# Patient Record
Sex: MALE | Race: WHITE | Employment: OTHER | URBAN - METROPOLITAN AREA
[De-identification: names, ages, dates, MRNs, and addresses within clinical notes are randomized per-mention and may not be internally consistent; named-entity substitution may affect disease eponyms.]

---

## 2017-04-09 ENCOUNTER — APPOINTMENT (OUTPATIENT)
Dept: ULTRASOUND IMAGING | Facility: HOSPITAL | Age: 62
DRG: 291 | End: 2017-04-09
Attending: INTERNAL MEDICINE
Payer: COMMERCIAL

## 2017-04-09 ENCOUNTER — APPOINTMENT (OUTPATIENT)
Dept: GENERAL RADIOLOGY | Facility: HOSPITAL | Age: 62
DRG: 291 | End: 2017-04-09
Attending: EMERGENCY MEDICINE
Payer: COMMERCIAL

## 2017-04-09 ENCOUNTER — HOSPITAL ENCOUNTER (INPATIENT)
Facility: HOSPITAL | Age: 62
LOS: 6 days | Discharge: HOME | DRG: 291 | End: 2017-04-15
Attending: EMERGENCY MEDICINE | Admitting: INTERNAL MEDICINE
Payer: COMMERCIAL

## 2017-04-09 DIAGNOSIS — R09.02 HYPOXIA: Primary | ICD-10-CM

## 2017-04-09 DIAGNOSIS — R77.8 ELEVATED TROPONIN: ICD-10-CM

## 2017-04-09 DIAGNOSIS — N17.9 ACUTE RENAL FAILURE, UNSPECIFIED ACUTE RENAL FAILURE TYPE (HCC): ICD-10-CM

## 2017-04-09 DIAGNOSIS — J81.0 ACUTE PULMONARY EDEMA (HCC): ICD-10-CM

## 2017-04-09 DIAGNOSIS — M31.31 WEGENER'S GRANULOMATOSIS WITH RENAL INVOLVEMENT (HCC): ICD-10-CM

## 2017-04-09 PROBLEM — R79.89 ELEVATED TROPONIN: Status: ACTIVE | Noted: 2017-04-09

## 2017-04-09 PROCEDURE — 99223 1ST HOSP IP/OBS HIGH 75: CPT | Performed by: INTERNAL MEDICINE

## 2017-04-09 PROCEDURE — 71010 XR CHEST AP PORTABLE  (CPT=71010): CPT

## 2017-04-09 PROCEDURE — 76770 US EXAM ABDO BACK WALL COMP: CPT

## 2017-04-09 RX ORDER — HYDRALAZINE HYDROCHLORIDE 25 MG/1
25 TABLET, FILM COATED ORAL EVERY 8 HOURS SCHEDULED
Status: DISCONTINUED | OUTPATIENT
Start: 2017-04-09 | End: 2017-04-10

## 2017-04-09 RX ORDER — METHYLPREDNISOLONE SODIUM SUCCINATE 500 MG/1
500 INJECTION, POWDER, FOR SOLUTION INTRAMUSCULAR; INTRAVENOUS DAILY
Status: DISCONTINUED | OUTPATIENT
Start: 2017-04-09 | End: 2017-04-09 | Stop reason: SDUPTHER

## 2017-04-09 RX ORDER — IPRATROPIUM BROMIDE AND ALBUTEROL SULFATE 2.5; .5 MG/3ML; MG/3ML
3 SOLUTION RESPIRATORY (INHALATION) ONCE
Status: COMPLETED | OUTPATIENT
Start: 2017-04-09 | End: 2017-04-09

## 2017-04-09 RX ORDER — ASPIRIN 81 MG/1
324 TABLET, CHEWABLE ORAL ONCE
Status: COMPLETED | OUTPATIENT
Start: 2017-04-09 | End: 2017-04-09

## 2017-04-09 RX ORDER — ZOLPIDEM TARTRATE 5 MG/1
5 TABLET ORAL NIGHTLY PRN
Status: DISCONTINUED | OUTPATIENT
Start: 2017-04-09 | End: 2017-04-15

## 2017-04-09 RX ORDER — BUMETANIDE 0.25 MG/ML
2 INJECTION, SOLUTION INTRAMUSCULAR; INTRAVENOUS EVERY 8 HOURS SCHEDULED
Status: DISCONTINUED | OUTPATIENT
Start: 2017-04-09 | End: 2017-04-09

## 2017-04-09 RX ORDER — NITROGLYCERIN 20 MG/100ML
50 INJECTION INTRAVENOUS CONTINUOUS
Status: DISCONTINUED | OUTPATIENT
Start: 2017-04-09 | End: 2017-04-15

## 2017-04-09 RX ORDER — FUROSEMIDE 10 MG/ML
40 INJECTION INTRAMUSCULAR; INTRAVENOUS ONCE
Status: COMPLETED | OUTPATIENT
Start: 2017-04-09 | End: 2017-04-09

## 2017-04-09 RX ORDER — BUMETANIDE 0.25 MG/ML
4 INJECTION, SOLUTION INTRAMUSCULAR; INTRAVENOUS ONCE
Status: COMPLETED | OUTPATIENT
Start: 2017-04-09 | End: 2017-04-09

## 2017-04-09 NOTE — ED PROVIDER NOTES
Patient Seen in: [facility] Emergency Department    History   Patient presents with:  Dyspnea GODWIN SOB (respiratory)  Swelling Edema (cardiovascular, metabolic)    Stated Complaint: shortness of breath x 3 weeks, worsening over past 3 days.  worse wit Smoking Status: Former Smoker                   Packs/Day: 0.00  Years:           Quit date: 08/18/1990    Smokeless Status: Never Used                        Alcohol Use: Yes           0.0 oz/week       0 Standard drinks or equivalent per week       Comme components within normal limits   CBC W/ DIFFERENTIAL - Abnormal; Notable for the following:     WBC 17.8 (*)     RBC 3.28 (*)     HGB 9.9 (*)     HCT 29.7 (*)     Neutrophil Absolute Prelim 16.07 (*)     Neutrophil Absolute 16.07 (*)     Lymphocyte Absolu Clinical Impression:  Hypoxia  (primary encounter diagnosis)  Acute pulmonary edema (HCC)  Acute renal failure, unspecified acute renal failure type (HCC)  Elevated troponin    Disposition:  Admit    Follow-up:  No follow-up provider specified.     Medi

## 2017-04-09 NOTE — ED INITIAL ASSESSMENT (HPI)
Pt here stating he had flu several weeks ago was given z-pack and prednisone. Once finished meds he call PMD needing more meds for sob , inhalers given with prednisone.  Pt continues to be sob

## 2017-04-09 NOTE — CONSULTS
[facility]  Report of Consultation    John Mo Patient Status:  Inpatient    9/15/1955 MRN [id number]   [facility] 6NE-A Attending Lilly Marion MD   Hosp Day # 0 PCP Danyel Reddy MD     Reason for [facility] 500 mg, Intravenous, Q24H  •  bumetanide (BUMEX) injection 4 mg, 4 mg, Intravenous, Once  •  bumetanide (BUMEX) 12.5 mg in sodium chloride 0.9 % 100 mL infusion, 0.5 mg/hr, Intravenous, Continuous  Home Medications:    No current outpatient prescriptions o 7.02*   08/01/2013 6.87*   07/30/2013 6.41*   ----------  CREATININE (mg/dL)   Date Value   04/09/2017 10.10*   05/05/2016 4.00*   01/13/2016 4.07*   11/30/2015 3.82*   08/04/2014 3.05*   07/02/2014 2.67*   06/03/2014 2.68*   ----------    Biopsy report fr

## 2017-04-09 NOTE — RESPIRATORY THERAPY NOTE
Transported pt from ER to 6610. Pt requesting bipap off. BS good air entry SATS 100% RR23. 2L NC applied sats appropriate during transport. Upon entry to 66 pt RR increased to 30 and sats to 91%. WOB looked to be increased. Pt placed back on bipap 12/5.

## 2017-04-09 NOTE — CONSULTS
[facility] Cardiology  Consultation Note      Anabelle Mulligan Patient Status:  Emergency    9/15/1955 MRN [id number]   Location [address] [facility] Attending Antonio Carl MD   Hosp Day # 0 PCP Kirstin Martin MD (age of onset: 64) in his father. Social History   reports that he quit smoking about 26 years ago. He has never used smokeless tobacco. He reports that he drinks alcohol. He reports that he does not use illicit drugs.      Allergies    Penicillin [Penic Results  Component Value Date   PT 14.4 08/07/2013   INR 1.10 04/09/2017   INR 1.39* 08/22/2015          Lab Results  Component Value Date   WBC 17.8 04/09/2017   HGB 9.9 04/09/2017   HCT 29.7 04/09/2017   .0 04/09/2017   CREATSERUM 10.10 04/09/2017

## 2017-04-09 NOTE — PROGRESS NOTES
Pt rec'd from ER. Weaning off NTG. Bumex bolus and gtt started. Remains on Bipap. SOB and resp rate 30's. U/S of kidney done. Plan of care done.

## 2017-04-09 NOTE — ED NOTES
RT notified for bipap , breathing treatment.  Pt oxygen sats 81% RA, nonrebreather applied @ 15L of oxygen

## 2017-04-09 NOTE — ED NOTES
Pt ready to transport up to 6610. bipap removed per pt request. rn and monitor with RT transporting . Will watch oxygen sats en route.  2l per nc/applied

## 2017-04-10 ENCOUNTER — APPOINTMENT (OUTPATIENT)
Dept: INTERVENTIONAL RADIOLOGY/VASCULAR | Facility: HOSPITAL | Age: 62
DRG: 291 | End: 2017-04-10
Attending: INTERNAL MEDICINE
Payer: COMMERCIAL

## 2017-04-10 PROCEDURE — 02HV33Z INSERTION OF INFUSION DEVICE INTO SUPERIOR VENA CAVA, PERCUTANEOUS APPROACH: ICD-10-PCS | Performed by: RADIOLOGY

## 2017-04-10 PROCEDURE — 99233 SBSQ HOSP IP/OBS HIGH 50: CPT | Performed by: INTERNAL MEDICINE

## 2017-04-10 PROCEDURE — 5A1D60Z PERFORMANCE OF URINARY FILTRATION, MULTIPLE: ICD-10-PCS | Performed by: INTERNAL MEDICINE

## 2017-04-10 RX ORDER — HEPARIN SODIUM 1000 [USP'U]/ML
1.5 INJECTION, SOLUTION INTRAVENOUS; SUBCUTANEOUS ONCE
Status: COMPLETED | OUTPATIENT
Start: 2017-04-10 | End: 2017-04-10

## 2017-04-10 RX ORDER — HEPARIN SODIUM 5000 [USP'U]/ML
INJECTION, SOLUTION INTRAVENOUS; SUBCUTANEOUS
Status: COMPLETED
Start: 2017-04-10 | End: 2017-04-10

## 2017-04-10 RX ORDER — CLINDAMYCIN PHOSPHATE 150 MG/ML
INJECTION, SOLUTION INTRAVENOUS
Status: COMPLETED
Start: 2017-04-10 | End: 2017-04-10

## 2017-04-10 RX ORDER — LIDOCAINE HYDROCHLORIDE AND EPINEPHRINE 10; 10 MG/ML; UG/ML
INJECTION, SOLUTION INFILTRATION; PERINEURAL
Status: COMPLETED
Start: 2017-04-10 | End: 2017-04-10

## 2017-04-10 RX ORDER — HYDRALAZINE HYDROCHLORIDE 50 MG/1
50 TABLET, FILM COATED ORAL EVERY 8 HOURS SCHEDULED
Status: DISCONTINUED | OUTPATIENT
Start: 2017-04-10 | End: 2017-04-15

## 2017-04-10 RX ORDER — ALBUMIN (HUMAN) 12.5 G/50ML
100 SOLUTION INTRAVENOUS AS NEEDED
Status: DISCONTINUED | OUTPATIENT
Start: 2017-04-10 | End: 2017-04-15

## 2017-04-10 RX ORDER — DEXTROSE MONOHYDRATE 25 G/50ML
50 INJECTION, SOLUTION INTRAVENOUS
Status: DISCONTINUED | OUTPATIENT
Start: 2017-04-10 | End: 2017-04-15

## 2017-04-10 RX ORDER — HEPARIN SODIUM 1000 [USP'U]/ML
1500 INJECTION, SOLUTION INTRAVENOUS; SUBCUTANEOUS
Status: DISCONTINUED | OUTPATIENT
Start: 2017-04-10 | End: 2017-04-15

## 2017-04-10 RX ORDER — MIDAZOLAM HYDROCHLORIDE 1 MG/ML
INJECTION INTRAMUSCULAR; INTRAVENOUS
Status: COMPLETED
Start: 2017-04-10 | End: 2017-04-10

## 2017-04-10 RX ORDER — LIDOCAINE HYDROCHLORIDE 10 MG/ML
INJECTION, SOLUTION INFILTRATION; PERINEURAL
Status: COMPLETED
Start: 2017-04-10 | End: 2017-04-10

## 2017-04-10 NOTE — PROCEDURES
[facility]  Procedure Note    Maine Wilsonh Patient Status:  Inpatient    9/15/1955 MRN [id number]   [facility] 6NE-A Attending Darlene Neil MD   Hosp Day # 1 PCP Joe Jones MD     Procedure: right IJ tunneled permca

## 2017-04-10 NOTE — PROGRESS NOTES
[facility]  Progress Note    Denton Hdez Patient Status:  Inpatient    9/15/1955 MRN [id number]   [facility] 6NE-A Attending Estefania Han MD   Hosp Day # 1 PCP Chris Fraire MD     Feels a bit better   Good respo 19.0*   * 136* 142*   CREATSERUM 10.10* 10.40* 10.70*   CA 8.5 8.0* 8.2*   MG  --   --  2.6         Recent Labs   04/09/17  1134   ALT 86*   AST 37   ALB 3.2*           Lab Results  Component Value Date   COLORUR Straw 04/09/2017   CLARITY Hazy 04/0 titers/GBM ab  - continue steroids - solumedrol 500 daily x 3 days  - aggressive diuretics -  on bumex gtt  - plan to start HD today; will consider PLEX tomorrow     2.  Dyspnea - related to fluid fluid overalod +/- concern for pulmonary-renal syndrome in l

## 2017-04-10 NOTE — PAYOR COMM NOTE
Attending Physician: Sharon Ann MD    Review Type: ADMISSION   Reviewer: Ru Wallace       Date: April 10, 2017 - 9:07 AM  Payor: JOVANI HOWE  Authorization Number: [id number]  Admit date: 4/9/2017 11:18 AM   Admitted from Emergency Dept.: yes    REVIEWER C MISBAH RN      furosemide (LASIX) injection 40 mg     Date Action Dose Route User    4/9/2017 1133 Given 40 mg Intravenous (Right Antecubital) Violet Rosales RN      hydrALAzine HCl (APRESOLINE) tab 25 mg     Date Action Dose Route User    4/10/2017 8393 following:     Pro-Beta Natriuretic Peptide [id number] (*)     All other components within normal limits   TROPONIN I - Abnormal; Notable for the following:     Troponin 0.086 (*)     All other components within normal limits   ABG PANEL W ELECT AND LACTATE - A Prelim 16.07 (*)     Neutrophil Absolute 16.07 (*)     Lymphocyte Absolute 0.30 (*)     Monocyte Absolute 1.18 (*)     All other components within normal limits   PTT, ACTIVATED - Normal    Narrative:      The aPTT Heparin Therapeutic Range is approximately history  - diurese; appreciate cardiology eval     3. HTN - better than on admit now; continue diuretics; start hydralazine 25 q8    4.  Anemia - monitor: check iron studies      Plan: Admit to ICU  2. Optimize blood pressure  3.  Diuresis per nephrology  4

## 2017-04-10 NOTE — CONSULTS
Pulmonary H&P/Consult       NAME: Winsome Escobar - ROOM: [address] [place], [place] [address]-X - MRN: [id number] - Age: 59year old - :  9/15/1955    Date of Admission: 2017 11:18 AM  Admission Diagnosis: Acute pulmonary edema ([facility] [address] [J81.0]  Hypoxia [R09.02]  Elevated t 20 tablet Rfl: 0 4/9/2017 at 0900   Albuterol Sulfate HFA (PROVENTIL HFA) 108 (90 Base) MCG/ACT Inhalation Aero Soln Inhale 2 puffs into the lungs every 4 to 6 hours as needed for Wheezing.  Disp: 1 Inhaler Rfl: 0 4/9/2017 at 0900   aspirin 81 MG Oral Tab E epoetin donita  10,000 Units Intravenous Once in dialysis   • insulin aspart  2-10 Units Subcutaneous TID CC and HS   • methylPREDNISolone (Solu-MEDROL) IVPB  500 mg Intravenous Q24H     Continuous Infusing Medication:  • Nitroglycerin in D5W 20 mcg/min (04/ (Temporal)  Resp 51  Ht 5' 9\" (1.753 m)  Wt 212 lb (96.163 kg)  BMI 31.29 kg/m2  SpO2 99%    General Appearance:    Alert, cooperative, no distress, appears stated age   Head:    Normocephalic, without obvious abnormality, atraumatic   Eyes:    PERRL, con Final   08/07/2013 09:59 AM 1.16 0.90 - 1.20 Final   Comment:   INR Therapeutic Interval Group A (2.00-3.00)  Venous Thrombosis, Pulmonary   Systemic Thrombosis,  Tissue Heart Valve, Acute Myocardial Infarction,  Valvular Heart Disease or Atrial Fibrillati

## 2017-04-10 NOTE — PROGRESS NOTES
04/10/17 1228   Clinical Encounter Type   Visited With Patient   Continue Visiting No   Sacramental Encounters   Sacrament of Sick-Anointing (Eletha Pramod provided support, scripture, prayer and sacrament of the sick)

## 2017-04-10 NOTE — H&P
[facility] Hospitalist History and Physical      Patient presents with:  Dyspnea GODWIN SOB (respiratory)  Swelling Edema (cardiovascular, metabolic)       PCP: Ayanna Lei MD      History of Present Illness: Patient is a 59year old male with PMH sig for UC Disp:  Rfl:    methylPREDNISolone 4 MG Oral Tablet Therapy Pack Use as directed for 6 days Disp: 21 tablet Rfl: 0            Smoking status: Former Smoker     Quit date: 08/18/1990    Smokeless tobacco: Never Used    Alcohol Use: Yes  0.0 oz/week    Terry Hemphill ALKPHO 80 04/09/2017   BILT 0.4 04/09/2017   TP 7.0 04/09/2017   AST 37 04/09/2017   ALT 86 04/09/2017   PTT 33.0 04/09/2017   INR 1.10 04/09/2017   PTP 14.2 04/09/2017   MG 2.6 04/10/2017   TROP 0.086 04/09/2017   PGLU 225 04/10/2017       CXR: image pe pleural effusions. Postoperative changes of median sternotomy approximated by wire sutures. 4/9/2017  CONCLUSION:  #1. Cardiomegaly. #2. Bilateral interstitial and alveolar opacities which may represent edema versus pneumonia. #3.  Hyperexpansion of th

## 2017-04-10 NOTE — PLAN OF CARE
Pt hypertensive this am, restarted nitro. Pt able to have a conversation without shortness of breathe. Off bipap, up in chair, on 6L high flow NC now. Will continue to monitor.

## 2017-04-10 NOTE — PLAN OF CARE
Assumed pt care @ 1930. Pt A & Ox 4. ST on monitor, on bipap with RR in 30's. Bumex gtt infusing, able to wean off nitro gtt. Hemodynamically pt is stable with bipap on. Starts to desaturate with mask off.  Still experiencing some dyspnea at rest, but state

## 2017-04-10 NOTE — PLAN OF CARE
Patient/Family Goals    • Patient/Family Long Term Goal Progressing    • Patient/Family Short Term Goal Progressing        RESPIRATORY - ADULT    • Achieves optimal ventilation and oxygenation Progressing          Rec pt @ 0730. Awake, alert, & approp.   Angie Castro

## 2017-04-10 NOTE — PROGRESS NOTES
[facility] Cardiology Progress Note        Vizcaino Peers Patient Status:  Inpatient    9/15/1955 MRN [id number]   [facility] 6NE-A Attending Goldy Marsh MD   [address] Day # 1 PCP Aristeo Mcconnell extremities  Skin: Warm and dry, no obvious rashes     MEDICATIONS:  • Heparin Sodium (Porcine)  1.5 mL Intravenous Once   • hydrALAzine HCl  50 mg Oral Q8H [address]   • iron sucrose  200 mg Intravenous Daily   • epoetin donita  10,000 Units Intravenous Once in d

## 2017-04-11 ENCOUNTER — APPOINTMENT (OUTPATIENT)
Dept: GENERAL RADIOLOGY | Facility: HOSPITAL | Age: 62
DRG: 291 | End: 2017-04-11
Attending: INTERNAL MEDICINE
Payer: COMMERCIAL

## 2017-04-11 PROCEDURE — 99233 SBSQ HOSP IP/OBS HIGH 50: CPT | Performed by: INTERNAL MEDICINE

## 2017-04-11 PROCEDURE — 71010 XR CHEST AP PORTABLE  (CPT=71010): CPT

## 2017-04-11 RX ORDER — BUMETANIDE 0.25 MG/ML
2 INJECTION, SOLUTION INTRAMUSCULAR; INTRAVENOUS EVERY 12 HOURS
Status: DISCONTINUED | OUTPATIENT
Start: 2017-04-11 | End: 2017-04-15

## 2017-04-11 RX ORDER — ALBUMIN, HUMAN INJ 5% 5 %
3000 SOLUTION INTRAVENOUS ONCE
Status: COMPLETED | OUTPATIENT
Start: 2017-04-11 | End: 2017-04-11

## 2017-04-11 NOTE — PLAN OF CARE
Assumed pt care at [address] [facility]. Pt A & O x4. ST on monitor, on 6L high flow NC, VSS. Bumex gtt stopped per orders at the beginning of HD. Nitro gtt weaned off during HD. HD completed @ 6210, 2.7L taken off.  Pt hypotensive right after dialysis, evening dose of hyd

## 2017-04-11 NOTE — PROGRESS NOTES
[facility]  Progress Note    Triston Locke Patient Status:  Inpatient    9/15/1955 MRN [id number]   [facility] 6NE-A Attending Alex Nevarez MD   Hosp Day # 2 PCP Sabrina Zapata MD     Feels better today   Denies cp source Temporal, resp. rate 20, height 175.3 cm (5' 9\"), weight 212 lb (96.163 kg), SpO2 95 %. General: NAD  HEENT: Moist mucous membranes. EOM-I. PERRL  Neck: No lymphadenopathy. No JVD. No carotid bruits.   Respiratory: Decreased breath sounds @ bases -Acute tubular necrosis.      -Diffuse interstitial fibrosis and tubular atrophy involving 75% of  the cortex.       -See note.     subnephrotic range proteinuria noted      Imaging:  Reviewed    Impression:  1. NANCY/CKD - stage IV/V related to biopsy prove

## 2017-04-11 NOTE — PLAN OF CARE
Assumed care of pt this am. Pt alert and oriented. Pt having plasmapheresis every other day for 5 treatments. HD planned for tomorrow. Questions and concerns addressed. Will continue to monitor.

## 2017-04-11 NOTE — CM/SW NOTE
04/11/17 1100   CM/SW Referral Data   Referral Source Physician   Reason for Referral Discharge planning;Psychoscial assessment; Other  (Outpt HD)   Informant Patient   Pertinent Medical Hx   Primary Care Physician Name Yolie Persaud MD   Patient In

## 2017-04-11 NOTE — PROGRESS NOTES
[address] [facility] Patient Status:  Inpatient    9/15/1955 MRN [id number]   [facility] 6NE-A Attending Yonathan Romo MD   Hosp Day # 2 PCP Rod Clifford MD     SUBJECTIVE:Still needing a bit of O2.  6L now.   But Tartrate (AMBIEN) tab 5 mg, 5 mg, Oral, Nightly PRN     /70 mmHg  Pulse 102  Temp(Src) 97.6 °F (36.4 °C) (Temporal)  Resp 22  Ht 5' 9\" (1.753 m)  Wt 212 lb (96.163 kg)  BMI 31.29 kg/m2  SpO2 95%  General appearance: alert, appears stated age and

## 2017-04-11 NOTE — PROGRESS NOTES
[facility] Cardiology Progress Note        Jeremy Olson Patient Status:  Inpatient    9/15/1955 MRN [id number]   [facility] 6NE-A Attending Dunia Pereyra MD   [facility] Day # 2 PCP Brayan Wheeler MEDICATIONS:  • insulin detemir  5 Units Subcutaneous Daily   • [START ON 4/12/2017] predniSONE  60 mg Oral Daily with breakfast   • bumetanide  2 mg Intravenous Q12H   • Albumin Human  3,000 mL Intravenous Once   • Electrolyte bag for plasmapheresis   I

## 2017-04-11 NOTE — CM/SW NOTE
SW received call from [facility] admissions [phone], stating that [employer] is not credential w/ pt's [employer]. SW update pt re: above. Pt agreed w/ [place] ([place]).  SW called and left vmail for Shahida

## 2017-04-11 NOTE — PROGRESS NOTES
[facility] Hospitalist Progress Note                                                                   [id number]  9/15/1955    CC: F/U Dyspnea, GPA    SUBJECTIVE:    States breathing is Infusions:   • Nitroglycerin in D5W Stopped (04/10/17 2100)   • bumetanide (BUMEX) 0.125 mg/ml infusion Stopped (04/10/17 1910)     PRN: Albumin Human, Heparin Sodium (Porcine), Heparin Sodium (Porcine), glucose **OR** Glucose-Vitamin C **OR** dextrose **O

## 2017-04-12 ENCOUNTER — APPOINTMENT (OUTPATIENT)
Dept: CV DIAGNOSTICS | Facility: HOSPITAL | Age: 62
DRG: 291 | End: 2017-04-12
Attending: INTERNAL MEDICINE
Payer: COMMERCIAL

## 2017-04-12 ENCOUNTER — APPOINTMENT (OUTPATIENT)
Dept: GENERAL RADIOLOGY | Facility: HOSPITAL | Age: 62
DRG: 291 | End: 2017-04-12
Attending: INTERNAL MEDICINE
Payer: COMMERCIAL

## 2017-04-12 PROCEDURE — 99233 SBSQ HOSP IP/OBS HIGH 50: CPT | Performed by: INTERNAL MEDICINE

## 2017-04-12 PROCEDURE — 71010 XR CHEST AP PORTABLE  (CPT=71010): CPT

## 2017-04-12 PROCEDURE — 93306 TTE W/DOPPLER COMPLETE: CPT

## 2017-04-12 PROCEDURE — 93306 TTE W/DOPPLER COMPLETE: CPT | Performed by: INTERNAL MEDICINE

## 2017-04-12 NOTE — PAYOR COMM NOTE
Attending Physician: Destini Alexandra MD    Review Type: CONTINUED STAY  Reviewer: Timothy Oneil     Date: April 12, 2017 - 11:55 AM  Payor: JOVANI PPPINA  Authorization Number: [id number]  Admit date: 4/9/2017 11:18 AM        REVIEWER COMMENTS  Vitals: 4/11 Given 16 Units Subcutaneous (Left Lower Abdomen) Nimesh Martinez RN    4/11/2017 1820 Given 16 Units Subcutaneous (Left Upper Arm) Sudarshan Salgado RN      insulin detemir (LEVEMIR) 100 UNIT/ML flextouch 10 Units     Date Action Dose Route User    4/

## 2017-04-12 NOTE — PROGRESS NOTES
[facility] Hospitalist Progress Note                                                                   [id number]  9/15/1955    CC: F/U Dyspnea, GPA    SUBJECTIVE:    States breathing is Subcutaneous Once   • predniSONE  60 mg Oral Daily with breakfast   • bumetanide  2 mg Intravenous Q12H   • epoetin donita  10,000 Units Intravenous Once in dialysis   • insulin aspart  4-20 Units Subcutaneous TID CC and HS   • insulin detemir  10 Units Subc SCD's    Dispo: transfer to telemetry    Rosemary Rodriguez MD  [facility] Hospitalist  Pager: [phone]

## 2017-04-12 NOTE — CM/SW NOTE
Shahida from [facility] called. Updated her re: pt being of with [facility] Location. She will call back with schedule.   Umm Cooper, 04/12/2017, 2:12 PM

## 2017-04-12 NOTE — PLAN OF CARE
Assumed pt care @ 1930. Pt A&O x 4. ST on monitor, 6l o2 via High flow NC. VSS. Pt walked the halls with o2, was able to carry a conversation, tolerated it fairly well. Pt able to sleep at night again without bipap. Weaned Oxygen to 4l.  Will continue to mo

## 2017-04-12 NOTE — PROGRESS NOTES
[facility] Cardiology Progress Note        Dallin Batres Patient Status:  Inpatient    9/15/1955 MRN [id number]   [facility] 6NE-A Attending Michele Heard MD   [address] [employer] Day # 3 PCP Lee Glover good chest wall expansion  Abdomen: Soft, non-tender, non-distended. Extremities:  Peripheral pulses are  1+, no edema.   Neurologic: Alert , moves all extremities, ambulatory  Skin: Warm and dry, no obvious rashes     MEDICATIONS:  • predniSONE  60 mg O

## 2017-04-12 NOTE — PLAN OF CARE
Assumed care of patient at [address] [place] [address]. Patient alert, oriented, and neurologically intact. VSS and patient not complaining of any pain. IV in place and stable. HD scheduled for today. Patient and family updated on plan of care and condition.  Will continue to mon

## 2017-04-12 NOTE — PROGRESS NOTES
[address] [facility] Patient Status:  Inpatient    9/15/1955 MRN [id number]   [facility] 6NE-A Attending Finn Diaz MD   [facility] Day # 3 PCP Paty Aguayo MD     SUBJECTIVE:Less O2 needs now.   Still very dyspneic Tartrate (AMBIEN) tab 5 mg, 5 mg, Oral, Nightly PRN     /76 mmHg  Pulse 101  Temp(Src) 97.8 °F (36.6 °C) (Temporal)  Resp 27  Ht 175.3 cm (5' 9\")  Wt 212 lb (96.163 kg)  BMI 31.29 kg/m2  SpO2 95%  General appearance: alert, appears stated age and co

## 2017-04-12 NOTE — PLAN OF CARE
Patient/Family Goals    • Patient/Family Long Term Goal Progressing    • Patient/Family Short Term Goal Progressing        RESPIRATORY - ADULT    • Achieves optimal ventilation and oxygenation Progressing        Pt AOx3, SR/ST, lungs CTA/diminished, bs pre

## 2017-04-12 NOTE — PROGRESS NOTES
[facility]  Progress Note    Jeremy Olson Patient Status:  Inpatient    9/15/1955 MRN [id number]   [facility] 6NE-A Attending Alea Jones MD   Hosp Day # 3 PCP Blossom Lane MD     Was doing well this am;  Took a breath sounds @ bases   Cardiovascular: S1, S2.  Regular rate and rhythm. No murmurs. Equal pulses   Abdomen: Soft, nontender, nondistended. Positive bowel sounds. No rebound tenderness   Neurologic: No focal neurological deficits.    Musculoskeletal: Ful finished 500 mg qday x3, now on prednisone  - continue diuretics  - continue with every other day plamapheresis  - Plan for HD today; social work helping with o/p placment     2.  Dyspnea - related to fluid fluid overalod +/- concern for pulmonary-renal syn

## 2017-04-13 ENCOUNTER — APPOINTMENT (OUTPATIENT)
Dept: GENERAL RADIOLOGY | Facility: HOSPITAL | Age: 62
DRG: 291 | End: 2017-04-13
Attending: INTERNAL MEDICINE
Payer: COMMERCIAL

## 2017-04-13 ENCOUNTER — TELEPHONE (OUTPATIENT)
Dept: NEPHROLOGY | Facility: CLINIC | Age: 62
End: 2017-04-13

## 2017-04-13 PROCEDURE — 99233 SBSQ HOSP IP/OBS HIGH 50: CPT | Performed by: INTERNAL MEDICINE

## 2017-04-13 PROCEDURE — 71010 XR CHEST AP PORTABLE  (CPT=71010): CPT

## 2017-04-13 RX ORDER — ALBUMIN, HUMAN INJ 5% 5 %
3000 SOLUTION INTRAVENOUS ONCE
Status: COMPLETED | OUTPATIENT
Start: 2017-04-13 | End: 2017-04-13

## 2017-04-13 RX ORDER — CARVEDILOL 3.12 MG/1
3.12 TABLET ORAL 2 TIMES DAILY WITH MEALS
Status: DISCONTINUED | OUTPATIENT
Start: 2017-04-13 | End: 2017-04-14

## 2017-04-13 NOTE — PROGRESS NOTES
[facility] Cardiology Progress Note        Anabelle Mulligan Patient Status:  Inpatient    9/15/1955 MRN [id number]   [facility] 6NE-A Attending Sandy Mistry MD   [facility] Day # 4 PCP Emmanuel Obrien decreased breath sounds both bases, good chest wall expansion  Abdomen: Soft, non-tender, non-distended. Extremities:  Peripheral pulses are  1+, no edema.   Neurologic: Alert , moves all extremities, ambulatory  Skin: Warm and dry, no obvious rashes

## 2017-04-13 NOTE — PROGRESS NOTES
[facility]  Progress Note    [place] Patient Status:  Inpatient    9/15/1955 MRN [id number]   [facility] 6NE-A Attending Dominga Cook MD   [facility] Day # 4 PCP Jose Dominguez MD     NO acute issues overnight  Luis Enrique bruits. Respiratory: Decreased breath sounds @ bases   Cardiovascular: S1, S2.  Regular rate and rhythm. No murmurs. Equal pulses   Abdomen: Soft, nontender, nondistended. Positive bowel sounds.  No rebound tenderness   Neurologic: No focal neurological steroids  - continue diuretics  - continue with every other day plamapheresis - #2 treatment today  - continue HD 3x/week    2.  Dyspnea - related to fluid fluid overalod +/- concern for pulmonary-renal syndrome in light of patient's medical history  - as a

## 2017-04-13 NOTE — CM/SW NOTE
SW notified to fax updated HD flowsheets. [facility]RON faxed flowsheets to [facility] ([phone]). Patient discussed in rounds with RN, patient remains on 3L O2, does not have home O2. Plan is for patient to have plasmaphoresis today.  CM/SW to continu

## 2017-04-13 NOTE — PROGRESS NOTES
Pulmonary Progress Note     Assessment / Plan:  1. Hypoxia - due to pulm edema and acute pneumonitis from GPA  -improved  -cont to wean O2 as tolerated  2. Pulm Edema  -diuresis per renal  3.  Pneumonitis secondary to GPA  -treated with high dose steroids

## 2017-04-13 NOTE — PROGRESS NOTES
[facility] Hospitalist Progress Note                                                                   [address]  9/15/1955    CC: F/U Dyspnea, GPA    SUBJECTIVE:  Pt feeling stronger.  Marina Bowles • insulin aspart  4-20 Units Subcutaneous TID CC and HS   • hydrALAzine HCl  50 mg Oral Q8H [address]     Continuous Infusions:   • Nitroglycerin in D5W Stopped (04/10/17 2100)     PRN: CEPACOL, Lidocaine Viscous, Albumin Human, Heparin Sodium (Porcine), Hepar demargination    Prophylaxis:   DVT with SCD's    Dispo: Zacarias Orozco MD  [facility] IM Hospitalist  Pager: [phone]

## 2017-04-13 NOTE — PLAN OF CARE
Patient/Family Goals    • Patient/Family Long Term Goal Progressing    • Patient/Family Short Term Goal Progressing        RESPIRATORY - ADULT    • Achieves optimal ventilation and oxygenation Progressing          Pt AOx4. 4 L high flow NC, O2 sats >92.  DO

## 2017-04-13 NOTE — CM/SW NOTE
SW received message from [facility] (ZKAI [phone]). She states pt's dialysis clinic needs pt's most recent dialysis run sheet faxed to them at [phone]. Maikel left message for CTU 2 SW.     Umm Cooper, 04/13/2017, 9:42 AM

## 2017-04-13 NOTE — CM/SW NOTE
SW provided with Dialysis Schedule Letter from [facility]. Patient is currently scheduled for MWF schedule at 4pm. SW met with patient and provided copy of HD schedule to the patient, patient is agreeable to schedule. CM/SW to continue to follow.

## 2017-04-14 PROCEDURE — 90935 HEMODIALYSIS ONE EVALUATION: CPT | Performed by: INTERNAL MEDICINE

## 2017-04-14 RX ORDER — CARVEDILOL 6.25 MG/1
6.25 TABLET ORAL 2 TIMES DAILY WITH MEALS
Qty: 60 TABLET | Refills: 3 | Status: SHIPPED | OUTPATIENT
Start: 2017-04-14 | End: 2018-01-05

## 2017-04-14 RX ORDER — BUMETANIDE 2 MG/1
2 TABLET ORAL 2 TIMES DAILY
Qty: 60 TABLET | Refills: 1 | Status: SHIPPED | OUTPATIENT
Start: 2017-04-14 | End: 2017-04-15

## 2017-04-14 RX ORDER — ALBUMIN, HUMAN INJ 5% 5 %
3000 SOLUTION INTRAVENOUS ONCE
Status: COMPLETED | OUTPATIENT
Start: 2017-04-15 | End: 2017-04-15

## 2017-04-14 RX ORDER — SULFAMETHOXAZOLE AND TRIMETHOPRIM 400; 80 MG/1; MG/1
1 TABLET ORAL
Qty: 15 TABLET | Refills: 3 | Status: SHIPPED | OUTPATIENT
Start: 2017-04-14 | End: 2017-09-06

## 2017-04-14 RX ORDER — FLUCONAZOLE 2 MG/ML
200 INJECTION, SOLUTION INTRAVENOUS EVERY 24 HOURS
Status: DISCONTINUED | OUTPATIENT
Start: 2017-04-14 | End: 2017-04-14

## 2017-04-14 RX ORDER — CARVEDILOL 6.25 MG/1
6.25 TABLET ORAL 2 TIMES DAILY WITH MEALS
Status: DISCONTINUED | OUTPATIENT
Start: 2017-04-14 | End: 2017-04-15

## 2017-04-14 RX ORDER — HYDRALAZINE HYDROCHLORIDE 50 MG/1
50 TABLET, FILM COATED ORAL EVERY 8 HOURS SCHEDULED
Qty: 90 TABLET | Refills: 3 | Status: SHIPPED | OUTPATIENT
Start: 2017-04-14 | End: 2017-04-27 | Stop reason: ALTCHOICE

## 2017-04-14 RX ORDER — ACYCLOVIR 400 MG/1
400 TABLET ORAL 2 TIMES DAILY
Status: DISCONTINUED | OUTPATIENT
Start: 2017-04-14 | End: 2017-04-15

## 2017-04-14 RX ORDER — FLUCONAZOLE 2 MG/ML
200 INJECTION, SOLUTION INTRAVENOUS
Status: DISCONTINUED | OUTPATIENT
Start: 2017-04-14 | End: 2017-04-15

## 2017-04-14 RX ORDER — SULFAMETHOXAZOLE AND TRIMETHOPRIM 400; 80 MG/1; MG/1
1 TABLET ORAL
Status: DISCONTINUED | OUTPATIENT
Start: 2017-04-14 | End: 2017-04-15

## 2017-04-14 RX ORDER — PREDNISONE 20 MG/1
60 TABLET ORAL
Qty: 30 TABLET | Refills: 1 | Status: SHIPPED | OUTPATIENT
Start: 2017-04-14 | End: 2017-04-15

## 2017-04-14 NOTE — CONSULTS
INFECTIOUS DISEASE CONSULT NOTE    Jeremy Olson Patient Status:  Inpatient    9/15/1955 MRN [id number]   [facility] 2NE-A Attending Linnette Anders MD   [facility] Day # 5 PCP Brayan Wheeler medications:   •  sulfamethoxazole-trimethoprim (BACTRIM,SEPTRA) 400-80 MG per tab 1 tablet, 1 tablet, Oral, Once per day on Mon Wed Fri  •  nystatin (MYCOSTATIN) suspension 500,000 Units, 5 mL, Oral, QID  •  [START ON 4/15/2017] Albumin Human (Narinder Courser) Systems:    Completed. See pertinent positives and negatives in the the HPI. Physical Exam:    General: No acute distress. Alert and oriented x 3.   Vital signs: Blood pressure 113/63, pulse 83, temperature 97.5 °F (36.4 °C), temperature source Oral, res Routine technique was utilized. FINDINGS:   RIGHT KIDNEY MEASUREMENTS:  8.8 x 4.3 x 4.0 cm ECHOGENICITY:  Increased echogenicity HYDRONEPHROSIS:  None. CYSTS/STONES/MASSES:  None.   LEFT KIDNEY MEASUREMENTS:  9.1 x 4.4 x 4.2 cm ECHOGENICITY:  Increased ec (As transcribed by Technologist)     FINDINGS:   No major change in diffuse interstitial and airspace densities throughout both lungs. Continued small effusions, not significantly changed. Heart is enlarged. Status post median sternotomy.  Right double lume he has been having difficulty breathing for about 3 weeks with worsening symptoms the past three days. Symptoms increase with exertion. Pt is using nebulizer at home. H/O CABG x3. FINDINGS:  Cardiomegaly.  Bilateral interstitial and alveolar opacities neck MEDICATIONS:  1% lidocaine. Conscious sedation was utilized for the procedure. After receiving the patient's consent, moderate sedation was achieved with Versed and fentanyl.  Monitoring of the patient's vital signs was provided by trained nursing staf

## 2017-04-14 NOTE — PLAN OF CARE
Patient/Family Goals    • Patient/Family Long Term Goal Progressing    • Patient/Family Short Term Goal Progressing        RESPIRATORY - ADULT    • Achieves optimal ventilation and oxygenation Progressing          A/O x 3  ID called per RN for new consult

## 2017-04-14 NOTE — CM/SW NOTE
BYRON received phone call from [facility] [address] at [place] [address] ([phone]). Shahida inquired about when patient would be discharged and start date for HD. BYRON informed Shahida that discharge date is unclear.  [facility] Intake will need to be contacted once patient is

## 2017-04-14 NOTE — PROGRESS NOTES
[facility] Pharmacy Note:  Renal Adjustment for Fluconazole    Kari Benoit is a 59year old male who has been prescribed fluconazole every 24hrs. CrCl is estimated creatinine clearance is 10.4 mL/min (based on Cr of 7.27).   Pt receiving dialysis 3 x/week

## 2017-04-14 NOTE — PROGRESS NOTES
[facility]  Progress Note    Jordan Murphy Patient Status:  Inpatient    9/15/1955 MRN [id number]   [facility] 6NE-A Attending Franny Edwards MD   [facility] Day # 5 PCP Peter Putnam MD     + sore on his tongue which is p signs: Blood pressure 125/65, pulse 88, temperature 97.9 °F (36.6 °C), temperature source Oral, resp. rate 18, height 69\", weight 199 lb 4.7 oz, SpO2 99 %. General: NAD  HEENT: Moist mucous membranes. EOM-I. PERRL  Neck: No lymphadenopathy. No JVD.  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 Patient states he did not follow up in clinic because he was feeling \"really good\".    Renal US c/w with chronic medi

## 2017-04-14 NOTE — CM/SW NOTE
HARRIET spoke with Reshma Salas in [facility] regarding arrangements for pt to have outpatient plasmapheresis next Monday and Wednesday 4/17 and 4/19. Informed they already have a pt scheduled for the 0900 time slot and next time would be 1300.  Pt already has scheduled HD t

## 2017-04-14 NOTE — PLAN OF CARE
Pt is AOx4, denies chest pain or dyspnea. 3L nasal cannula. SR/ST on tele. QID accucheck. Right subclavian permacath. Up with SBA. Pt complains of a sore throat that he thinks is from the oxygen drying his throat out. Lozenges ordered prn.   Will mon

## 2017-04-14 NOTE — PROGRESS NOTES
[facility] Cardiology Progress Note        Kari Benoit Patient Status:  Inpatient    9/15/1955 MRN [id number]   [facility] 6NE-A Attending Andriy Ramos MD   [address] Day # 5 PCP Rica Fiore bases, good chest wall expansion  Abdomen: Soft, non-tender, non-distended. Extremities:  Peripheral pulses are  1+, no edema.   Neurologic: Alert , moves all extremities, ambulatory  Skin: Warm and dry, no obvious rashes     MEDICATIONS:  • sulfamethoxa

## 2017-04-14 NOTE — CM/SW NOTE
BYRON spoke to Seng srivastava, charge RN in [facility] ([id number]) to discuss plasmapheresis for patient as an outpatient. BYRON informed Seng srivastava that patient is expected to discharge this weekend and would need plasmapheresis treatment on Monday and Wednesday.  BYRON info

## 2017-04-14 NOTE — PROGRESS NOTES
[facility] Hospitalist Progress Note                                                                   [address]  9/15/1955    CC: F/U Dyspnea, GPA    SUBJECTIVE:  Pt feeling worn out th carvedilol  6.25 mg Oral BID with meals   • epoetin donita  10,000 Units Intravenous Once in dialysis   • insulin detemir  15 Units Subcutaneous Daily   • insulin aspart  5 Units Subcutaneous TID CC   • predniSONE  60 mg Oral Daily with breakfast   • bumetan 4/12 + adding mealtime coverage with 5U scheduled with resultant improved control in BGs.   -continue high dose SSI  -cont close monitoring    Iron deficiency anemia  -GIANNA/IV iron per nephro    Tongue ulceration  -Will swab for HSV  -viscous lidocaine PRN

## 2017-04-14 NOTE — PLAN OF CARE
During dialysis  @16:45:00-8 beats [address] [facility] notified- d/t dialysis, no new orders  Followed by 6 beats VTACH then another 6 beats VTACH- patient asymptomatic    HSV 1/2 PCR ordered- send out lab called per this RN- will use swab taken this AM,

## 2017-04-15 VITALS
SYSTOLIC BLOOD PRESSURE: 104 MMHG | HEART RATE: 86 BPM | DIASTOLIC BLOOD PRESSURE: 55 MMHG | WEIGHT: 194.88 LBS | OXYGEN SATURATION: 96 % | BODY MASS INDEX: 28.87 KG/M2 | HEIGHT: 69 IN | TEMPERATURE: 98 F | RESPIRATION RATE: 18 BRPM

## 2017-04-15 PROCEDURE — 99233 SBSQ HOSP IP/OBS HIGH 50: CPT | Performed by: INTERNAL MEDICINE

## 2017-04-15 RX ORDER — BUMETANIDE 2 MG/1
1 TABLET ORAL 2 TIMES DAILY
Qty: 60 TABLET | Refills: 1 | Status: SHIPPED | OUTPATIENT
Start: 2017-04-15 | End: 2017-06-14

## 2017-04-15 RX ORDER — DEXTROSE MONOHYDRATE 25 G/50ML
50 INJECTION, SOLUTION INTRAVENOUS
Status: DISCONTINUED | OUTPATIENT
Start: 2017-04-15 | End: 2017-04-15

## 2017-04-15 RX ORDER — PREDNISONE 20 MG/1
40 TABLET ORAL
Qty: 30 TABLET | Refills: 1 | Status: ON HOLD | OUTPATIENT
Start: 2017-04-15 | End: 2017-05-02

## 2017-04-15 RX ORDER — POTASSIUM CHLORIDE 20 MEQ/1
20 TABLET, EXTENDED RELEASE ORAL DAILY
Status: DISCONTINUED | OUTPATIENT
Start: 2017-04-15 | End: 2017-04-15

## 2017-04-15 RX ORDER — FLUCONAZOLE 200 MG/1
200 TABLET ORAL DAILY
Qty: 14 TABLET | Refills: 0 | Status: ON HOLD | OUTPATIENT
Start: 2017-04-15 | End: 2017-04-29

## 2017-04-15 RX ORDER — ACYCLOVIR 400 MG/1
400 TABLET ORAL 2 TIMES DAILY
Qty: 10 TABLET | Refills: 0 | Status: SHIPPED | OUTPATIENT
Start: 2017-04-15 | End: 2017-04-20

## 2017-04-15 NOTE — HOME CARE LIAISON
Received referral for [facility]. Met with patient who is agreeable to [facility]. Agency brochure provided to patient. Referral sent to [facility] via [facility]. [facility] has accepted pt and will provide skilled nurse and Tele-Health.        Thank you for this referr

## 2017-04-15 NOTE — PLAN OF CARE
Patient is alert and oriented. Saturates well on room air, NSR on the tele. Patient denies any pain or SOB. Patient is up with stand by assist.  Call light within reach, will continue to monitor.      Patient/Family Goals    • [facility]

## 2017-04-15 NOTE — PROGRESS NOTES
[facility] Hospitalist Progress Note                                                                   [address]  9/15/1955    CC: F/U Dyspnea, GPA    SUBJECTIVE:  Feeling well- anxious Units Subcutaneous TID CC and HS   • Electrolyte bag for plasmapheresis   Intravenous Q48H   • predniSONE  60 mg Oral Daily with breakfast   • bumetanide  2 mg Intravenous Q12H   • hydrALAzine HCl  50 mg Oral Q8H [address]     Continuous Infusions:   • Nitroglyc iron per nephro    Leukocytosis  - WBC reviewed and uptrending  - ?steroid induced demargination  - ID eval as above    Prophylaxis:   DVT with SCD's    Dispo: Possile DC today pending ID recs.     Kaiden Montes MD  [facility] Hospitalist  Pager: [phone]

## 2017-04-15 NOTE — PROGRESS NOTES
[facility] Cardiology Progress Note        Reta Mckeon Patient Status:  Inpatient    9/15/1955 MRN [id number]   [facility] 6NE-A Attending Cailin Wilson MD   [facility] Day # 6 PCP Beatriz Escobar bruits  Cardiac:    S1 S2, S4, grade 1 systolic ejection murmur, right sided dialysis catheter  Lungs: Rhonchi  bilaterally, decreased breath sounds both bases, good chest wall expansion  Abdomen: Soft, non-tender, non-distended.     Extremities:  Periphera

## 2017-04-15 NOTE — CM/SW NOTE
RN called requesting Ariasriosgabrielle Buck for pt due to new insulin at [place]. Advised that she give him glucometer.   [facility] Liaison paged re: order for [facility].\  Umm Cooper, 04/15/2017, 2:43 PM

## 2017-04-15 NOTE — PROGRESS NOTES
[facility] Patient Status:  Inpatient    9/15/1955 MRN [id number]   [facility] 2NE-A Attending Jenn Oglesby MD   Hosp Day # 6 PCP Joe Jones MD     SUBJECTIVE:no new events.  Feels much better overall Min PRN **OR** Glucose-Vitamin C (DEX-4) 4-0.006 g chewable tab 4 tablet, 4 tablet, Oral, Q15 Min PRN **OR** dextrose injection 50 mL, 50 mL, Intravenous, Q15 Min PRN **OR** glucose (DEX4) oral liquid 30 g, 30 g, Oral, Q15 Min PRN **OR** Glucose-Vitamin C

## 2017-04-15 NOTE — PLAN OF CARE
Patient stable, denies any chest pain or shortness of breath but states he does get short of breath with exertion. Seen by cardiologist this am and Dr Mini Mejias made aware of three episodes of V-tach yesterday. Per MD, ok to be discharged today.   MD just page

## 2017-04-16 NOTE — DISCHARGE SUMMARY
General Medicine Discharge Summary     Patient ID:  Nidia Phipps  59year old  9/15/1955    Admit date: 4/9/2017    Discharge date and time:  4/15/17    Attending Physician: No att. providers giorgi nephrology  -O2, wean as tolerated  -TTE with LVEF 45-50%  -Cardiology consulted, appreciate    - OK for home per pulm and renal today with 02 and close follow up.     Oral candidiasis/Sore throat/Tongue lesion  - Id consulted- currently on IV fluconazole f Normal, Disp-15 tablet, R-3      CONTINUE these medications which have CHANGED    bumetanide 2 MG Oral Tab  Take 0.5 tablets (1 mg total) by mouth 2 (two) times daily. , Normal, Disp-60 tablet, R-1    predniSONE 20 MG Oral Tab  Take 2 tablets (40 mg total)

## 2017-04-17 ENCOUNTER — HOSPITAL ENCOUNTER (OUTPATIENT)
Dept: PERIOP | Facility: HOSPITAL | Age: 62
Setting detail: HOSPITAL OUTPATIENT SURGERY
Discharge: HOME | End: 2017-04-17
Attending: INTERNAL MEDICINE
Payer: COMMERCIAL

## 2017-04-17 VITALS
HEART RATE: 79 BPM | OXYGEN SATURATION: 98 % | SYSTOLIC BLOOD PRESSURE: 121 MMHG | RESPIRATION RATE: 20 BRPM | DIASTOLIC BLOOD PRESSURE: 75 MMHG | TEMPERATURE: 98 F

## 2017-04-17 PROCEDURE — 6A550Z3 PHERESIS OF PLASMA, SINGLE: ICD-10-PCS | Performed by: INTERNAL MEDICINE

## 2017-04-17 PROCEDURE — P9045 ALBUMIN (HUMAN), 5%, 250 ML: HCPCS | Performed by: INTERNAL MEDICINE

## 2017-04-17 PROCEDURE — 36514 APHERESIS PLASMA: CPT

## 2017-04-17 RX ORDER — SODIUM CHLORIDE 9 MG/ML
INJECTION, SOLUTION INTRAVENOUS ONCE
Status: DISCONTINUED | OUTPATIENT
Start: 2017-04-17 | End: 2017-04-21

## 2017-04-17 RX ORDER — HEPARIN SODIUM 1000 [USP'U]/ML
4000 INJECTION, SOLUTION INTRAVENOUS; SUBCUTANEOUS ONCE
Status: DISCONTINUED | OUTPATIENT
Start: 2017-04-17 | End: 2017-04-21

## 2017-04-17 RX ORDER — ALBUMIN, HUMAN INJ 5% 5 %
3000 SOLUTION INTRAVENOUS ONCE
Status: DISCONTINUED | OUTPATIENT
Start: 2017-04-17 | End: 2017-04-21

## 2017-04-17 NOTE — PAYOR COMM NOTE
Attending Physician: No att. providers found    Review Type: CONTINUED STAY  Reviewer: Vishnu Rod     Date: April 17, 2017 - 11:38 AM  Payor: JOVANI HOWE  Authorization Number: [id number]  Admit date: 4/9/2017 11:18 AM        REVIEWER COMMENTS  Vitals: 4

## 2017-04-17 NOTE — CM/SW NOTE
04/17/17 0800   Discharge disposition   Discharged to: Home-Health   Name of Facillity/Home Care/Hospice Residential   Outpatient services Dialysis   Home services after discharge Skilled home care   Discharge transportation Private car   Patient discha

## 2017-04-17 NOTE — OR PREOP
Pt discharged in stable condition, lillei PEÑA gave [facility] RN report. Said pt tolerated procedure well. VSS. Pt ambulated without assistance out of dept.

## 2017-04-19 ENCOUNTER — HOSPITAL ENCOUNTER (OUTPATIENT)
Dept: PERIOP | Facility: HOSPITAL | Age: 62
Discharge: HOME | End: 2017-04-19
Attending: INTERNAL MEDICINE
Payer: COMMERCIAL

## 2017-04-19 VITALS
SYSTOLIC BLOOD PRESSURE: 102 MMHG | HEART RATE: 72 BPM | RESPIRATION RATE: 18 BRPM | TEMPERATURE: 98 F | DIASTOLIC BLOOD PRESSURE: 59 MMHG | OXYGEN SATURATION: 98 %

## 2017-04-19 PROCEDURE — P9045 ALBUMIN (HUMAN), 5%, 250 ML: HCPCS | Performed by: INTERNAL MEDICINE

## 2017-04-19 PROCEDURE — 36514 APHERESIS PLASMA: CPT

## 2017-04-19 RX ORDER — SODIUM CHLORIDE 9 MG/ML
INJECTION, SOLUTION INTRAVENOUS ONCE
Status: DISCONTINUED | OUTPATIENT
Start: 2017-04-19 | End: 2017-04-23

## 2017-04-19 RX ORDER — ALBUMIN, HUMAN INJ 5% 5 %
3000 SOLUTION INTRAVENOUS ONCE
Status: DISCONTINUED | OUTPATIENT
Start: 2017-04-19 | End: 2017-04-23

## 2017-04-19 RX ORDER — HEPARIN SODIUM 1000 [USP'U]/ML
4000 INJECTION, SOLUTION INTRAVENOUS; SUBCUTANEOUS
Status: DISCONTINUED | OUTPATIENT
Start: 2017-04-19 | End: 2017-04-23

## 2017-04-20 ENCOUNTER — OFFICE VISIT (OUTPATIENT)
Dept: NEPHROLOGY | Facility: CLINIC | Age: 62
End: 2017-04-20

## 2017-04-20 VITALS — DIASTOLIC BLOOD PRESSURE: 74 MMHG | WEIGHT: 199 LBS | SYSTOLIC BLOOD PRESSURE: 118 MMHG | BODY MASS INDEX: 29 KG/M2

## 2017-04-20 DIAGNOSIS — I10 ESSENTIAL HYPERTENSION: ICD-10-CM

## 2017-04-20 DIAGNOSIS — N18.6 ESRD (END STAGE RENAL DISEASE) (HCC): Primary | ICD-10-CM

## 2017-04-20 DIAGNOSIS — M31.30 WEGENER'S GRANULOMATOSIS: ICD-10-CM

## 2017-04-20 PROCEDURE — 99215 OFFICE O/P EST HI 40 MIN: CPT | Performed by: INTERNAL MEDICINE

## 2017-04-20 NOTE — PROGRESS NOTES
Nephrology Progress Note      ASSESSMENT/PLAN:        1) NANCY / CKD 4 / ESRD- presented to [facility] on April 9 with uremic symptoms and a creatinine of 10 presumably due to natural progression of chronic kidney disease from previous Wegener's granulo disorder    • Wegener's granulomatosis (granulomatosis with polyangiitis) ([facility] [address] 2013     Required temporary dialysis   • STEMI (ST elevation myocardial infarction) ([facility] [address] 8/18/15     emergency angioplasty, [facility]          Past Surgical History    C Aero Soln Inhale 2 puffs into the lungs every 4 to 6 hours as needed for Wheezing. Disp: 1 Inhaler Rfl: 0   aspirin 81 MG Oral Tab EC Take 1 tablet (81 mg total) by mouth daily.  Disp:  Rfl:        Allergies:    Penicillin [Penicil*    Unknown    ROS:     D

## 2017-04-25 ENCOUNTER — TELEPHONE (OUTPATIENT)
Dept: NEPHROLOGY | Facility: CLINIC | Age: 62
End: 2017-04-25

## 2017-04-26 ENCOUNTER — HOSPITAL ENCOUNTER (OUTPATIENT)
Dept: CT IMAGING | Age: 62
Discharge: HOME | End: 2017-04-26
Attending: INTERNAL MEDICINE
Payer: COMMERCIAL

## 2017-04-26 DIAGNOSIS — N18.6 ESRD (END STAGE RENAL DISEASE) (HCC): ICD-10-CM

## 2017-04-26 DIAGNOSIS — I10 ESSENTIAL HYPERTENSION: ICD-10-CM

## 2017-04-26 DIAGNOSIS — M31.30 WEGENER'S GRANULOMATOSIS: ICD-10-CM

## 2017-04-26 PROCEDURE — 71250 CT THORAX DX C-: CPT

## 2017-04-29 ENCOUNTER — HOSPITAL ENCOUNTER (INPATIENT)
Facility: HOSPITAL | Age: 62
LOS: 3 days | Discharge: HOME | DRG: 377 | End: 2017-05-02
Attending: EMERGENCY MEDICINE | Admitting: HOSPITALIST
Payer: COMMERCIAL

## 2017-04-29 DIAGNOSIS — D64.9 ANEMIA, UNSPECIFIED TYPE: Primary | ICD-10-CM

## 2017-04-29 DIAGNOSIS — M31.31 WEGENER'S GRANULOMATOSIS WITH RENAL INVOLVEMENT (HCC): ICD-10-CM

## 2017-04-29 DIAGNOSIS — K92.1 GASTROINTESTINAL HEMORRHAGE WITH MELENA: ICD-10-CM

## 2017-04-29 DIAGNOSIS — N18.6 ESRD (END STAGE RENAL DISEASE) (HCC): ICD-10-CM

## 2017-04-29 PROCEDURE — 5A1D00Z PERFORMANCE OF URINARY FILTRATION, SINGLE: ICD-10-PCS | Performed by: EMERGENCY MEDICINE

## 2017-04-29 PROCEDURE — 99223 1ST HOSP IP/OBS HIGH 75: CPT | Performed by: INTERNAL MEDICINE

## 2017-04-29 RX ORDER — ALBUTEROL SULFATE 2.5 MG/3ML
2.5 SOLUTION RESPIRATORY (INHALATION) EVERY 6 HOURS PRN
Status: DISCONTINUED | OUTPATIENT
Start: 2017-04-29 | End: 2017-05-02

## 2017-04-29 RX ORDER — SODIUM CHLORIDE 9 MG/ML
INJECTION, SOLUTION INTRAVENOUS ONCE
Status: COMPLETED | OUTPATIENT
Start: 2017-04-29 | End: 2017-04-29

## 2017-04-29 RX ORDER — SODIUM CHLORIDE 9 MG/ML
INJECTION, SOLUTION INTRAVENOUS CONTINUOUS
Status: DISCONTINUED | OUTPATIENT
Start: 2017-04-29 | End: 2017-05-01

## 2017-04-29 RX ORDER — MAGNESIUM OXIDE 400 MG (241.3 MG MAGNESIUM) TABLET
2 TABLET NIGHTLY
Status: DISCONTINUED | OUTPATIENT
Start: 2017-04-29 | End: 2017-05-02

## 2017-04-29 RX ORDER — METOPROLOL TARTRATE 5 MG/5ML
5 INJECTION INTRAVENOUS EVERY 6 HOURS
Status: DISCONTINUED | OUTPATIENT
Start: 2017-04-29 | End: 2017-05-01

## 2017-04-29 RX ORDER — DEXTROSE MONOHYDRATE 25 G/50ML
50 INJECTION, SOLUTION INTRAVENOUS
Status: DISCONTINUED | OUTPATIENT
Start: 2017-04-29 | End: 2017-05-02

## 2017-04-29 NOTE — ED PROVIDER NOTES
Patient Seen in: [facility] Emergency Department    History   Patient presents with:  Abnormal Result (metabolic, cardiac)    Stated Complaint: low hemoglobin- dialysis yesterday    HPI    Patient has remarkable medical history of end-stage renal di (6.25 mg total) by mouth 2 (two) times daily with meals. sulfamethoxazole-trimethoprim 400-80 MG Oral Tab,  Take 1 tablet by mouth 3 (three) times a week.    albuterol sulfate (2.5 MG/3ML) 0.083% Inhalation Nebu Soln,  Take 3 mL (2.5 mg total) by nebuliza the right chest wall  Lungs: Clear to auscultation bilaterally. No rhonchi or rales. Heart: Normal S1 and S2, without murmur or rub. Distal pulses are strong and symmetric. Abdomen: Soft, nondistended. Completely nontender  Extremities: Unremarkable. order TYPE AND SCREEN.   Procedure                               Abnormality         Status                     ---------                               -----------         ------                     BLOOD TYPE, ABO AND RH N[[id number]] This involved direct patient intervention, complex decision making, and/or extensive discussions with the patient, family, and clinical staff.   Present on Admission  Date Reviewed: 4/10/2017          ICD-10-CM Noted POA    Anemia D64.9 4/29/2017 Unknown

## 2017-04-29 NOTE — CONSULTS
[facility]  Report of Consultation    Crystal Chauhan Patient Status:  Inpatient    9/15/1955 MRN [id number]   [facility] 4SW-A Attending Glendy Rodriguez,*   Hosp Day # 0 PCP Senia Fan MD     Reason for Consultat elevated myocardial infarction (HCC)     Chronic kidney disease (CKD) stage G3b/A1, moderately decreased glomerular filtration rate (GFR) between 30-44 mL/min/1.73 square meter and albuminuria creatinine ratio less than 30 mg/g     Wegener's granulomatosis aspart 100 UNIT/ML Subcutaneous Solution Pen-injector Inject 1-5 Units into the skin TID CC and HS. Disp: 1 pen Rfl: 2   carvedilol 6.25 MG Oral Tab Take 1 tablet (6.25 mg total) by mouth 2 (two) times daily with meals.  Disp: 60 tablet Rfl: 3   sulfamethox membranes moist.  EYES: eomi   NECK:non tender, supple  RESPIRATORY: mild rales rt lung base  CARDIOVASCULAR: S1, S2 normal, RRR; no S3, no S4; no click; murmur negative  ABDOMEN: normal, active bowel sounds, no masses, HSM or tenderness   EXTREMITIES: no

## 2017-04-29 NOTE — CONSULTS
Critical Care H&P/Consult       NAME: Renetta Keene - ROOM: [facility]-J - MRN: [id number] - Age: 59year old - :  9/15/1955    Date of Admission: 2017  7:57 AM  Admission Diagnosis: ESRD (end stage renal disease) ([facility] [N18.6]  Wegener's granul mg total) by mouth 2 (two) times daily with meals. Disp: 60 tablet Rfl: 3 4/28/2017 at 1900   sulfamethoxazole-trimethoprim 400-80 MG Oral Tab Take 1 tablet by mouth 3 (three) times a week.  Disp: 15 tablet Rfl: 3 4/28/2017 at 0700   albuterol sulfate (2.5 sulfamethoxazole-trimethoprim 400-80 MG Oral Tab Take 1 tablet by mouth 3 (three) times a week.  Disp: 15 tablet Rfl: 3       Scheduled Medication:    Continuous Infusing Medication:    PRN Medication:     REVIEW OF SYSTEMS:   GENERAL:  feels well otherwi enlargement/tenderness/nodules; no carotid    bruit or JVD   Back:     Symmetric, no curvature, ROM normal, no CVA tenderness   Lungs:     Clear to auscultation bilaterally, respirations unlabored   Chest wall:    No tenderness or deformity   Heart:    Reg ----------  ALBUMIN   Date/Time Value Ref Range Status   04/29/2017 08:23 AM 2.6* 3.5-4.8 g/dL Final   08/04/2014 08:45 AM 3.3* 3.5 - 4.8 g/dL Final   08/05/2013 03:21 PM 3.5 3.5 - 5.5 g/dL Final   ----------      ASSESSMENT/PLAN:  1.  Acute blood loss an

## 2017-04-29 NOTE — ED INITIAL ASSESSMENT (HPI)
Pt had dialysis yesterday, was informed that hgb is low and to come to ER for evaluation.   Gets dialysis for kidney failure

## 2017-04-29 NOTE — CONSULTS
[facility]  Report of Consultation    Danyel Blevins Patient Status:  Inpatient    9/15/1955 MRN [id number]   [facility] 4SW-A Attending Diane Ambrocio,*   Hosp Day # 0 PCP Cecilio Connelly MD     Reason for Consultat otherwise unremarkable. Physical Exam:  Vital signs: Blood pressure 121/64, pulse 79, temperature 98.5 °F (36.9 °C), temperature source Temporal, resp. rate 19, height 68.27\", weight 198 lb 13.7 oz, SpO2 98 %. General: No acute distress.  Alert and nico bilateral right greater than left interstitial and airspace opacities including faint groundglass opacities.  Most likely on the basis of infectious or inflammatory etiology such as pneumonia however    alveolar hemorrhage is a consideration this patient wi

## 2017-04-29 NOTE — PLAN OF CARE
Results for Taiwo Bailey (MRN [id number]) as of 4/29/2017 17:05   Ref. Range 4/29/2017 16:14   Hemoglobin Latest Ref Range: 13.0-17.0 g/dL 6.1 (LL)     Results to Dr. Pascual Kingsley, to give 1-2 units if ok'd with nephrology.  Rick Ndiaye paged, waiting for call

## 2017-04-29 NOTE — PLAN OF CARE
Pt admitted to ICU from ED. Comfortable. Only c/o being cold. Arrived with 1 of 2 U prbc infusing. Pt reports no bloody stools or vomiting. Wife at bedside. Rectal exam by ED doc + mikayla.

## 2017-04-30 ENCOUNTER — SURGERY (OUTPATIENT)
Age: 62
End: 2017-04-30

## 2017-04-30 PROCEDURE — 99233 SBSQ HOSP IP/OBS HIGH 50: CPT | Performed by: INTERNAL MEDICINE

## 2017-04-30 PROCEDURE — 0DB68ZX EXCISION OF STOMACH, VIA NATURAL OR ARTIFICIAL OPENING ENDOSCOPIC, DIAGNOSTIC: ICD-10-PCS | Performed by: INTERNAL MEDICINE

## 2017-04-30 PROCEDURE — 0W3P8ZZ CONTROL BLEEDING IN GASTROINTESTINAL TRACT, VIA NATURAL OR ARTIFICIAL OPENING ENDOSCOPIC: ICD-10-PCS | Performed by: INTERNAL MEDICINE

## 2017-04-30 RX ORDER — MIDAZOLAM HYDROCHLORIDE 1 MG/ML
INJECTION INTRAMUSCULAR; INTRAVENOUS
Status: DISCONTINUED | OUTPATIENT
Start: 2017-04-30 | End: 2017-04-30 | Stop reason: HOSPADM

## 2017-04-30 RX ORDER — SUCRALFATE ORAL 1 G/10ML
1 SUSPENSION ORAL
Status: DISCONTINUED | OUTPATIENT
Start: 2017-04-30 | End: 2017-05-02

## 2017-04-30 RX ORDER — ALBUMIN (HUMAN) 12.5 G/50ML
100 SOLUTION INTRAVENOUS AS NEEDED
Status: DISCONTINUED | OUTPATIENT
Start: 2017-04-30 | End: 2017-05-02

## 2017-04-30 RX ORDER — HEPARIN SODIUM 1000 [USP'U]/ML
1.5 INJECTION, SOLUTION INTRAVENOUS; SUBCUTANEOUS ONCE
Status: COMPLETED | OUTPATIENT
Start: 2017-04-30 | End: 2017-05-01

## 2017-04-30 NOTE — PLAN OF CARE
Maintains hematologic stability Progressing      Patient/Family Goals    • Patient/Family Long Term Goal Progressing    • Patient/Family Short Term Goal Progressing        4th unit of PRBC completed before midnight, repeat Hgb post BT is 8.1, no s/s of GI

## 2017-04-30 NOTE — H&P
Late entry, patient seen early evening    [facility] Hospitalist History and Physical      CC: Anemia    PCP: Senia Fan MD      History of Present Illness: Patient is a 59year old male with PMH sig for Hx of UC, hx of Wegners with renal failure on HD a hours as needed for Wheezing. Disp: 1 Box Rfl: 1   Albuterol Sulfate HFA (PROVENTIL HFA) 108 (90 Base) MCG/ACT Inhalation Aero Soln Inhale 2 puffs into the lungs every 4 to 6 hours as needed for Wheezing.  Disp: 1 Inhaler Rfl: 0   aspirin 81 MG Oral Tab EC 13.1 04/29/2017       Above labs reviewed.         Assessment/Plan:   Principal Problem:    Anemia, unspecified type  Active Problems:    Wegener's granulomatosis with renal involvement ([facility] [address]    ESRD (end stage renal disease) (HCC)    Anemia    Gastrointest

## 2017-04-30 NOTE — PLAN OF CARE
HEMATOLOGIC - ADULT    • Maintains hematologic stability Progressing        No s/s of GI bleeding noted, able to sleep after the Melatonin PO. Repeat labs this morning, will recheck Hgb. Endorsed to incoming shift.

## 2017-04-30 NOTE — PROGRESS NOTES
[facility]  Progress Note    Perico Lay Patient Status:  Inpatient    9/15/1955 MRN [id number]   [facility] 4SW-A Attending Eduardo Torres,*   Hosp Day # 1 PCP Paty Aguayo MD     No acute issues overnight  S/ affect.       Recent Labs   04/29/17  0823 04/29/17  1614 04/30/17  0015 04/30/17  0843   WBC 8.8  --   --  6.6   HGB 4.7* 6.1* 8.1* 8.1*   .1*  --   --  95.3   .0  --   --  125.0*   INR 0.99  --   --   --          Recent Labs   04/29/17  0174

## 2017-04-30 NOTE — PROGRESS NOTES
[facility] Hospitalist Progress Note                                                                   [address]  9/15/1955    CC: FU anemia    Interval History:  - S/P EGD with large du 04/30/17   0608   GLU  123*  145*   BUN  32*  45*   CREATSERUM  4.15*  5.92*   CA  7.5*  7.3*   NA  141  138   K  3.5*  3.7   CL  100*  103   CO2  32.0  25.0           ROS: no change to ROS from my documentation yesterday, except as otherwise noted in the

## 2017-04-30 NOTE — PLAN OF CARE
HEMATOLOGIC - ADULT    • Maintains hematologic stability Progressing    • Free from bleeding injury Progressing        1930H Received watching TV at this time, denies any pain, states he is bored and wants to move around.   3rd unit of PRBC ongoing, no BT r

## 2017-04-30 NOTE — PROGRESS NOTES
[facility]  Progress Note    Warden Fofana Patient Status:  Inpatient    9/15/1955 MRN [id number]   [facility] 4SW-A Attending Chetna Mac,*   Hosp Day # 1 PCP Anyi Walker MD     Subjective:  Lavelle Porter Medications:  0.9%  NaCl infusion  Intravenous Continuous   Pantoprazole Sodium (PROTONIX) 40 mg in Sodium Chloride 0.9 % 10 mL IV push 40 mg Intravenous Q12H   hydrocortisone Na succinate PF (SOLU-cortef) injection 50 mg 50 mg Intravenous Q8H   metoprolol 04/30/2017   CA 7.3 04/30/2017   MG 2.2 04/30/2017             )      Lab Results  Component Value Date   PGLU 118 04/29/2017           Assessment and Plan:  Patient Active Problem List:     Renal failure     Non-ST elevated myocardial infarction ([facility] [address]

## 2017-04-30 NOTE — OPERATIVE REPORT
ENDOSCOPY OPERATIVE REPORT    Patient Name:  Emily Diaz Record #: [id number]  YOB: 1955  Date of Procedure: 4/30/2017    Preoperative Diagnosis:  Melena, acute blood loss anemia, heme (+) stool    Postoperative Diagnosis: post-bulbar duodenum were obtained. D2 normal. Distal bulb, large, deeply cratered ulcer was seen with large clot. Eventually removed scope and placed a EMR cap to help with visibility as the area of the ulcer was challenging to identify.   I could not se

## 2017-04-30 NOTE — PLAN OF CARE
Pt received at 0700. A&Ox4, congenial. IVF infusing through patent c/d/i PIV. Patient expressed concern regarding possible EGD and medical necessity.  Provided supportive listening, explained admitting hgb level, +guiac stool, also discussed chronic anemia

## 2017-05-01 PROCEDURE — 99233 SBSQ HOSP IP/OBS HIGH 50: CPT | Performed by: INTERNAL MEDICINE

## 2017-05-01 RX ORDER — PREDNISONE 10 MG/1
20 TABLET ORAL
Status: DISCONTINUED | OUTPATIENT
Start: 2017-05-01 | End: 2017-05-01

## 2017-05-01 RX ORDER — CARVEDILOL 6.25 MG/1
6.25 TABLET ORAL 2 TIMES DAILY WITH MEALS
Status: DISCONTINUED | OUTPATIENT
Start: 2017-05-01 | End: 2017-05-02

## 2017-05-01 RX ORDER — PREDNISONE 10 MG/1
10 TABLET ORAL
Status: DISCONTINUED | OUTPATIENT
Start: 2017-05-01 | End: 2017-05-02

## 2017-05-01 RX ORDER — DEXTROSE MONOHYDRATE 25 G/50ML
50 INJECTION, SOLUTION INTRAVENOUS
Status: DISCONTINUED | OUTPATIENT
Start: 2017-05-01 | End: 2017-05-02

## 2017-05-01 RX ORDER — SULFAMETHOXAZOLE AND TRIMETHOPRIM 400; 80 MG/1; MG/1
1 TABLET ORAL
Status: DISCONTINUED | OUTPATIENT
Start: 2017-05-01 | End: 2017-05-02

## 2017-05-01 NOTE — PROGRESS NOTES
[facility]  Progress Note    Wayna Course Patient Status:  Inpatient    9/15/1955 MRN [id number]   [facility] 4SW-A Attending Renetta Andrew,*   Hosp Day # 2 PCP Bree Starr MD     Subjective:  Madhavi Vela 59year old male. Pt withrecent melena, DU bleed s/p EGD. Hb stable. No evidence of further bleeding. Plan:    1. IV PPI. 2.  Follow labs. 3.  Clear liquid diet. 4.  No ASA x 1 week.   5.  OK to floor    Veronica Bhakta  5/1/2017  8:09 AM

## 2017-05-01 NOTE — PROGRESS NOTES
Pulmonary Progress Note        NAME: Anh Course - ROOM: 46/461-A - MRN: [id number] - Age: 59year old - : 9/15/1955        SUBJECTIVE: No events overnight    OBJECTIVE:   17  0600 17  0700 17  0800 17  0900   BP: 130/69 ARTERIALPCO2, ARTERIALHCO3    No results for input(s): BNP in the last 72 hours.     Invalid input(s): TROPI      INR   Date/Time Value Ref Range Status   04/29/2017 08:23 AM 0.99 0.89-1.11 Final   Comment:     New lot of PT reagent started on February 28,2 Noel [facility]

## 2017-05-01 NOTE — PLAN OF CARE
HEMATOLOGIC - ADULT    • Maintains hematologic stability Progressing    • Free from bleeding injury Progressing        Patient/Family Goals    • Patient/Family Short Term Goal Progressing        Pt aox4. Flat affect.  Unhappy about having to stay in the [address] [employer]

## 2017-05-01 NOTE — PROGRESS NOTES
[facility] Hospitalist Progress Note                                                                   [address]  9/15/1955    CC: FU anemia    Interval History:  - Doing well Hgb stable MCH  32.2   --   31.4   --    --   30.9   MCHC  30.9*   --   32.9   --    --   32.8   RDW  21.2*   --   20.3*   --    --   19.9*   NEPRELIM  6.84*   --    --    --    --    --    WBC  8.8   --   6.6   --    --   6.2   PLT  184.0   --   125.0*   --    --

## 2017-05-01 NOTE — PROGRESS NOTES
[facility]  Nephrology Progress Note    Triston Locke Attending:  Reynaldo Aceves,*       Assessment and Plan:    1) ESRD- favor natural progression of severe CKD (Cr 4 2016) rather than flare of GPA; imaging shows atrophic kidneys- unl omar       Labs:     Lab Results  Component Value Date   WBC 6.2 05/01/2017   HGB 7.9 05/01/2017   HCT 24.1 05/01/2017   .0 05/01/2017   CREATSERUM 7.05 05/01/2017   BUN 56 05/01/2017    05/01/2017   K 3.7 05/01/2017    05/01/2017   CO

## 2017-05-01 NOTE — PAYOR COMM NOTE
Attending Physician: Jabari Madrigal,*    Review Type: ADMISSION   Reviewer: Sebas Beckett       Date: May 1, 2017 - 9:56 AM  Payor: JOVANI HOWE  Authorization Number: [id number]  Admit date: 4/29/2017  7:57 AM   Admitted from Emergency Dept.:yes     History Lizeth Capone MD      Pantoprazole Sodium (PROTONIX) 40 mg in sodium chloride 0.9 % 100 mL IVPB     Date Action Dose Route User    4/30/2017 1327 New Bag 40 mg Intravenous Ina Dawn RN      Pantoprazole Sodium (PROTONIX) 80 mg in sodium chloride 0.9 Macrocytosis Small (*)     All other components within normal limits   HEMOGLOBIN - Abnormal; Notable for the following:     HGB 6.1 (*)     All other components within normal limits   BASIC METABOLIC PANEL (8) - Abnormal; Notable for the following:     Gl (*)     Neutrophil Absolute Prelim 6.84 (*)     Neutrophil Absolute 6.84 (*)     All other components within normal limits   PROTHROMBIN TIME (PT) - Normal   PTT, ACTIVATED - Normal    Narrative:      The aPTT Heparin Therapeutic Range is approximately 65- granulomatosis with renal involvement ([facility] [address]    ESRD (end stage renal disease) ([facility] [address]    Anemia    Gastrointestinal hemorrhage with melena    Patient is a 59year old male with PMH sig for Hx of UC, hx of Wegners with renal failure on HD and CAD presenting d

## 2017-05-01 NOTE — PLAN OF CARE
HEMATOLOGIC - ADULT    • Maintains hematologic stability Progressing    • Free from bleeding injury Progressing          Patient/Family Goals    • Patient/Family Long Term Goal Progressing    • Patient/Family Short Term Goal Progressing        Assumed care

## 2017-05-01 NOTE — PROGRESS NOTES
Assumed care of patient at 1100, patient A&Ox4, pleasant, cooperative. NSR on tele with occasional PAC's present, denies CP. Saturating 95% on RA. VSS. Diet advanced to Diabetic, tolerated without c/o nausea, vomiting, or diarrhea. Denies pain.   Nora Beard

## 2017-05-02 VITALS
RESPIRATION RATE: 20 BRPM | HEART RATE: 74 BPM | HEIGHT: 68.27 IN | BODY MASS INDEX: 29.47 KG/M2 | SYSTOLIC BLOOD PRESSURE: 141 MMHG | TEMPERATURE: 98 F | DIASTOLIC BLOOD PRESSURE: 66 MMHG | OXYGEN SATURATION: 98 % | WEIGHT: 194.44 LBS

## 2017-05-02 PROCEDURE — 99232 SBSQ HOSP IP/OBS MODERATE 35: CPT | Performed by: INTERNAL MEDICINE

## 2017-05-02 RX ORDER — PANTOPRAZOLE SODIUM 20 MG/1
20 TABLET, DELAYED RELEASE ORAL DAILY
Qty: 30 TABLET | Refills: 12 | Status: SHIPPED | OUTPATIENT
Start: 2017-05-02 | End: 2017-09-06

## 2017-05-02 RX ORDER — PREDNISONE 20 MG/1
20 TABLET ORAL
Status: DISCONTINUED | OUTPATIENT
Start: 2017-05-03 | End: 2017-05-02

## 2017-05-02 RX ORDER — PREDNISONE 20 MG/1
20 TABLET ORAL DAILY
Qty: 30 TABLET | Refills: 11 | Status: SHIPPED | OUTPATIENT
Start: 2017-05-02 | End: 2017-09-06

## 2017-05-02 RX ORDER — PANTOPRAZOLE SODIUM 40 MG/1
40 TABLET, DELAYED RELEASE ORAL
Status: DISCONTINUED | OUTPATIENT
Start: 2017-05-02 | End: 2017-05-02

## 2017-05-02 NOTE — PROGRESS NOTES
[facility]  Progress Note    Ulysses Doana Patient Status:  Inpatient    9/15/1955 MRN [id number]   [facility] 3NE-A Attending More Tam,*   Hosp Day # 3 PCP Ayanna Lei MD     Subjective:  Nhi Myers daily.  3.  No ASA x 2 weeks.   4.  D/C home    Idania Kenney  5/2/2017  7:44 AM

## 2017-05-02 NOTE — CM/SW NOTE
Patient was screened during rounds and no needs are identified at this time. RN to contact SW/CM if needs arise. 05/02/17 1100   CM/SW Screening   Referral Source Social Work (self-referral)   [address] staff; Chart review;Nursing rounds

## 2017-05-02 NOTE — PROGRESS NOTES
NURSING DISCHARGE NOTE    Discharged Home via Wheelchair. Accompanied by Family member  Belongings Taken by patient/family. AVS and follow up reviewed with patient, verbalized understanding.

## 2017-05-02 NOTE — PLAN OF CARE
HEMATOLOGIC - ADULT    • Maintains hematologic stability Progressing    • Free from bleeding injury Progressing        Patient/Family Goals    • Patient/Family Long Term Goal Progressing    • Patient/Family Short Term Goal Progressing        Patient alert

## 2017-05-02 NOTE — PROGRESS NOTES
[facility]  Nephrology Progress Note    Vivien Garcia Attending:  Jayesh Peck,*       Assessment and Plan:    1) ESRD- favor natural progression of severe CKD (Cr 4 2016) rather than flare of GPA; imaging shows atrophic kidneys- unl nerves grossly intact, moving all extremities  Skin: Warm and dry, no rashes       Labs:     Lab Results  Component Value Date   WBC 5.5 05/02/2017   HGB 8.6 05/02/2017   HCT 26.1 05/02/2017   .0 05/02/2017   CREATSERUM 4.57 05/02/2017   BUN 30 05/0 Glucose-Vitamin C (DEX-4) 4-0.006 g chewable tab 8 tablet 8 tablet Oral Q15 Min PRN   melatonin tab TABS 2 mg 2 mg Oral Nightly         Questions/concerns were discussed with patient and/or family by bedside.           Adolfo Muñoz  5/2/2017  849 AM

## 2017-05-02 NOTE — PLAN OF CARE
HEMATOLOGIC - ADULT    • Maintains hematologic stability Adequate for Discharge    • Free from bleeding injury Adequate for Discharge        Patient/Family Goals    • Patient/Family Long Term Goal Adequate for Discharge    • Patient/Family Short Term Goal

## 2017-05-02 NOTE — PROGRESS NOTES
[facility] Note: Route Optimization for Pantoprazole (PROTONIX)    Patient is currently on Pantoprazole (PROTONIX) 40 mg IV twice daily.    The patient meets the criteria to convert to the oral equivalent as established by the IV to Oral conversion protoc

## 2017-05-02 NOTE — DISCHARGE SUMMARY
General Medicine Discharge Summary     Patient ID:  Renetta Keene  59year old  9/15/1955    Admit date: 4/29/2017    Discharge date and time: 5/2/2017    Attending Physician: Preston Bolden 4/26  - ?  Pulmonary involvement from vasculitis  - He has no current respiratory symptoms  - Pulmonary is on consult, plan for outpatient FU        Consults: IP CONSULT TO HOSPITALIST  IP CONSULT TO NEPHROLOGY  IP CONSULT TO GASTROENTEROLOGY  IP CONSULT TO Neuro: CN inact, no focal deficits      Total time coordinating care for discharge: Greater than 30 minutes    . Danette Lennon  [facility] Hospitalist  [phone]

## 2017-05-03 NOTE — PROGRESS NOTES
Quick Note:    Here are the biopsy/pathology findings from your recent EGD (uppper endoscopy): The biopsy/pathology findings from your upper endoscopy showed:  --ulcer of the small bowel was noted.  Ulcers may be from aspirin use or \"NSAID\" type medica

## 2017-05-05 NOTE — PAYOR COMM NOTE
Review Type: CONTINUED STAY  Reviewer: Tano Moscoso     Date: May 5, 2017 - 1:28 PM  Payor: Julito Tian PPO  Authorization Number: [id number]  Admit date: 4/29/2017  7:57 AM          Discharge date and time: 5/2/2017    Attending Physician: Radha Lanza TempSrc:      Temporal      Resp:  19  23  16  19    Height:            Weight:            SpO2:  93%  94%  97%  95%           Oxygen Therapy  SpO2: 95 %  O2 Device: None (Room air)  O2 Flow Rate (L/min): 3 L/min  ETCO2 (mmHg): 3 mmHg  Pulse Oximetry Typ 0. 89-1. 11  Final    Comment:      New lot of PT reagent started on February 28,2017. The new INR reference range is 0.89-1. 11.    08/26/2013 09:00 AM  0.9  0.8 - 1.3  Final    08/07/2013 09:59 AM  1.16  0.90 - 1.20  Final    Comment:    INR Therapeutic Int

## 2017-05-15 ENCOUNTER — TELEPHONE (OUTPATIENT)
Dept: NEPHROLOGY | Facility: CLINIC | Age: 62
End: 2017-05-15

## 2017-05-22 ENCOUNTER — TELEPHONE (OUTPATIENT)
Dept: NEPHROLOGY | Facility: CLINIC | Age: 62
End: 2017-05-22

## 2017-05-22 DIAGNOSIS — M31.30 WEGENER'S GRANULOMATOSIS: Primary | ICD-10-CM

## 2017-05-23 ENCOUNTER — HOSPITAL ENCOUNTER (OUTPATIENT)
Dept: CT IMAGING | Age: 62
Discharge: HOME | End: 2017-05-23
Attending: INTERNAL MEDICINE
Payer: COMMERCIAL

## 2017-05-23 DIAGNOSIS — M31.30 WEGENER'S GRANULOMATOSIS: ICD-10-CM

## 2017-05-23 PROCEDURE — 71260 CT THORAX DX C+: CPT | Performed by: INTERNAL MEDICINE

## 2017-05-26 ENCOUNTER — OFFICE VISIT (OUTPATIENT)
Dept: NEPHROLOGY | Facility: CLINIC | Age: 62
End: 2017-05-26

## 2017-05-26 VITALS
BODY MASS INDEX: 29 KG/M2 | DIASTOLIC BLOOD PRESSURE: 84 MMHG | WEIGHT: 195 LBS | HEART RATE: 80 BPM | RESPIRATION RATE: 18 BRPM | SYSTOLIC BLOOD PRESSURE: 140 MMHG

## 2017-05-26 DIAGNOSIS — N18.6 ESRD (END STAGE RENAL DISEASE) (HCC): ICD-10-CM

## 2017-05-26 DIAGNOSIS — I10 ESSENTIAL HYPERTENSION: ICD-10-CM

## 2017-05-26 DIAGNOSIS — M31.30 WEGENER'S GRANULOMATOSIS: Primary | ICD-10-CM

## 2017-05-26 PROCEDURE — 99215 OFFICE O/P EST HI 40 MIN: CPT | Performed by: INTERNAL MEDICINE

## 2017-05-26 NOTE — PROGRESS NOTES
Nephrology Progress Note      ASSESSMENT/PLAN:        1) NANCY / CKD 4 / ESRD- due to natural progression of severe chronic kidney disease and possible recurrent Wegener's granulomatosis; now feeling significantly better overall after starting dialysis in Ap CABG  8/22/15    Comment kelvin, [facility], Dr. Chapman      Family History   Problem Relation Age of Onset   • Heart Attack Father 66      Social History:   Smoking Status: Former Smoker                   Packs/Day: 0.00  Years:           Quit date: 08 distress. HEENT: No scleral icterus, MMM  Neck: Supple, no DARON or thyromegaly  Cardiac: Regular rate and rhythm, S1, S2 normal, no murmur or rub  Lungs: Clear without wheezes, rales, rhonchi.     Abdomen: Soft, non-tender. + bowel sounds, no palpable organ

## 2017-06-20 ENCOUNTER — OFFICE VISIT (OUTPATIENT)
Dept: HEMATOLOGY/ONCOLOGY | Age: 62
End: 2017-06-20
Attending: INTERNAL MEDICINE
Payer: COMMERCIAL

## 2017-06-20 VITALS
HEIGHT: 69 IN | WEIGHT: 195 LBS | SYSTOLIC BLOOD PRESSURE: 132 MMHG | BODY MASS INDEX: 28.88 KG/M2 | DIASTOLIC BLOOD PRESSURE: 81 MMHG | TEMPERATURE: 97 F | OXYGEN SATURATION: 96 % | HEART RATE: 85 BPM

## 2017-06-20 DIAGNOSIS — M31.31 WEGENER'S GRANULOMATOSIS WITH RENAL INVOLVEMENT (HCC): Primary | ICD-10-CM

## 2017-06-20 PROCEDURE — 96413 CHEMO IV INFUSION 1 HR: CPT

## 2017-06-20 PROCEDURE — 96415 CHEMO IV INFUSION ADDL HR: CPT

## 2017-06-20 PROCEDURE — 36415 COLL VENOUS BLD VENIPUNCTURE: CPT

## 2017-06-20 PROCEDURE — 85025 COMPLETE CBC W/AUTO DIFF WBC: CPT

## 2017-06-20 RX ORDER — ACETAMINOPHEN 325 MG/1
650 TABLET ORAL ONCE
Status: COMPLETED | OUTPATIENT
Start: 2017-06-20 | End: 2017-06-20

## 2017-06-20 RX ORDER — DIPHENHYDRAMINE HCL 25 MG
25 CAPSULE ORAL ONCE
Status: COMPLETED | OUTPATIENT
Start: 2017-06-20 | End: 2017-06-20

## 2017-06-20 RX ADMIN — ACETAMINOPHEN 650 MG: 325 TABLET ORAL at 08:22:00

## 2017-06-20 RX ADMIN — DIPHENHYDRAMINE HCL 25 MG: 25 MG CAPSULE ORAL at 08:22:00

## 2017-06-20 NOTE — PATIENT INSTRUCTIONS
Rituximab injection  What is this medicine? RITUXIMAB (ri TUX i mab) is a monoclonal antibody. It is used commonly to treat non-Hodgkin lymphoma and other conditions. It is also used to treat rheumatoid arthritis (RA).  In RA, this medicine slows the inf · medicines for blood pressure  · some other medicines for arthritis  · vaccines  What should I tell my health care provider before I take this medicine?   They need to know if you have any of these conditions:  · blood disorders  · heart disease  · history Immunotherapy is a way of treating disease using the body's immune system. One therapy is called monoclonal antibodies (mAbs). These are manufactured proteins that target specific parts of cells to destroy them.  This sheet tells you more about mAbs and wha During the course of your treatment, you'll have routine visits with your doctor. These allow your doctor to check your health and response to the treatment. After treatment ends, you and your doctor will discuss your treatment results.    Date Last Reviewe

## 2017-06-20 NOTE — PROGRESS NOTES
Education Record  Learner:  Patient  Disease / Diagnosis:   Wegener's disease; renal w/ pending kidney transplant  Barriers / Limitations:  None  Method:  Printed material and Reinforcement  General Topics:  Plan of care reviewed; monoclonal ab; Rituxan si

## 2017-06-26 RX ORDER — ACETAMINOPHEN 325 MG/1
650 TABLET ORAL ONCE
Status: CANCELLED | OUTPATIENT
Start: 2017-06-26

## 2017-06-26 RX ORDER — DIPHENHYDRAMINE HCL 25 MG
25 CAPSULE ORAL ONCE
Status: CANCELLED | OUTPATIENT
Start: 2017-06-26

## 2017-06-27 ENCOUNTER — OFFICE VISIT (OUTPATIENT)
Dept: HEMATOLOGY/ONCOLOGY | Age: 62
End: 2017-06-27
Attending: INTERNAL MEDICINE
Payer: COMMERCIAL

## 2017-06-27 VITALS
SYSTOLIC BLOOD PRESSURE: 155 MMHG | TEMPERATURE: 97 F | WEIGHT: 196.63 LBS | DIASTOLIC BLOOD PRESSURE: 75 MMHG | HEART RATE: 84 BPM | RESPIRATION RATE: 18 BRPM | OXYGEN SATURATION: 98 % | BODY MASS INDEX: 29.12 KG/M2 | HEIGHT: 69.02 IN

## 2017-06-27 DIAGNOSIS — M31.31 WEGENER'S GRANULOMATOSIS WITH RENAL INVOLVEMENT (HCC): Primary | ICD-10-CM

## 2017-06-27 PROCEDURE — 36415 COLL VENOUS BLD VENIPUNCTURE: CPT

## 2017-06-27 PROCEDURE — 96366 THER/PROPH/DIAG IV INF ADDON: CPT

## 2017-06-27 PROCEDURE — 85025 COMPLETE CBC W/AUTO DIFF WBC: CPT

## 2017-06-27 PROCEDURE — 96413 CHEMO IV INFUSION 1 HR: CPT

## 2017-06-27 RX ORDER — DIPHENHYDRAMINE HCL 25 MG
25 CAPSULE ORAL ONCE
Status: COMPLETED | OUTPATIENT
Start: 2017-06-27 | End: 2017-06-27

## 2017-06-27 RX ORDER — ACETAMINOPHEN 325 MG/1
650 TABLET ORAL ONCE
Status: COMPLETED | OUTPATIENT
Start: 2017-06-27 | End: 2017-06-27

## 2017-06-27 RX ADMIN — DIPHENHYDRAMINE HCL 25 MG: 25 MG CAPSULE ORAL at 08:18:00

## 2017-06-27 RX ADMIN — ACETAMINOPHEN 650 MG: 325 TABLET ORAL at 08:18:00

## 2017-06-27 NOTE — PROGRESS NOTES
Education Record  Learner:  Patient  Disease / Diagnosis:   Wegener's granulomatosis   Barriers / Limitations:  None  Method:  Printed material and Reinforcement  General Topics:  Plan of care reviewed; felt fine after   Outcome:  Shows understanding and P

## 2017-07-05 ENCOUNTER — TELEPHONE (OUTPATIENT)
Dept: NEPHROLOGY | Facility: CLINIC | Age: 62
End: 2017-07-05

## 2017-07-05 DIAGNOSIS — M31.30 WEGENER'S GRANULOMATOSIS: Primary | ICD-10-CM

## 2017-07-06 ENCOUNTER — OFFICE VISIT (OUTPATIENT)
Dept: HEMATOLOGY/ONCOLOGY | Age: 62
End: 2017-07-06
Attending: INTERNAL MEDICINE
Payer: COMMERCIAL

## 2017-07-06 VITALS
DIASTOLIC BLOOD PRESSURE: 86 MMHG | SYSTOLIC BLOOD PRESSURE: 131 MMHG | WEIGHT: 196.38 LBS | HEIGHT: 69.02 IN | OXYGEN SATURATION: 97 % | BODY MASS INDEX: 29.09 KG/M2 | HEART RATE: 85 BPM | TEMPERATURE: 99 F | RESPIRATION RATE: 18 BRPM

## 2017-07-06 DIAGNOSIS — M31.31 WEGENER'S GRANULOMATOSIS WITH RENAL INVOLVEMENT (HCC): Primary | ICD-10-CM

## 2017-07-06 LAB
BASOPHILS # BLD AUTO: 0.17 X10(3) UL (ref 0–0.1)
BASOPHILS NFR BLD AUTO: 1.9 %
EOSINOPHIL # BLD AUTO: 0.35 X10(3) UL (ref 0–0.3)
EOSINOPHIL NFR BLD AUTO: 3.8 %
ERYTHROCYTE [DISTWIDTH] IN BLOOD BY AUTOMATED COUNT: 15.3 % (ref 11.5–16)
HCT VFR BLD AUTO: 40.9 % (ref 37–53)
HGB BLD-MCNC: 13.2 G/DL (ref 13–17)
IMMATURE GRANULOCYTE COUNT: 0.06 X10(3) UL (ref 0–1)
IMMATURE GRANULOCYTE RATIO %: 0.7 %
LYMPHOCYTES # BLD AUTO: 1.29 X10(3) UL (ref 0.9–4)
LYMPHOCYTES NFR BLD AUTO: 14.1 %
MCH RBC QN AUTO: 31.8 PG (ref 27–33.2)
MCHC RBC AUTO-ENTMCNC: 32.3 G/DL (ref 31–37)
MCV RBC AUTO: 98.6 FL (ref 80–99)
MONOCYTES # BLD AUTO: 1.03 X10(3) UL (ref 0.1–0.6)
MONOCYTES NFR BLD AUTO: 11.2 %
NEUTROPHIL ABS PRELIM: 6.26 X10 (3) UL (ref 1.3–6.7)
NEUTROPHILS # BLD AUTO: 6.26 X10(3) UL (ref 1.3–6.7)
NEUTROPHILS NFR BLD AUTO: 68.3 %
PLATELET # BLD AUTO: 206 10(3)UL (ref 150–450)
RBC # BLD AUTO: 4.15 X10(6)UL (ref 4.3–5.7)
RED CELL DISTRIBUTION WIDTH-SD: 54.9 FL (ref 35.1–46.3)
WBC # BLD AUTO: 9.2 X10(3) UL (ref 4–13)

## 2017-07-06 PROCEDURE — 96413 CHEMO IV INFUSION 1 HR: CPT

## 2017-07-06 PROCEDURE — 96415 CHEMO IV INFUSION ADDL HR: CPT

## 2017-07-06 PROCEDURE — 85025 COMPLETE CBC W/AUTO DIFF WBC: CPT

## 2017-07-06 PROCEDURE — 36415 COLL VENOUS BLD VENIPUNCTURE: CPT

## 2017-07-06 RX ORDER — DIPHENHYDRAMINE HCL 25 MG
25 CAPSULE ORAL ONCE
Status: COMPLETED | OUTPATIENT
Start: 2017-07-06 | End: 2017-07-06

## 2017-07-06 RX ORDER — DIPHENHYDRAMINE HCL 25 MG
25 CAPSULE ORAL ONCE
Status: CANCELLED | OUTPATIENT
Start: 2017-07-06

## 2017-07-06 RX ORDER — ACETAMINOPHEN 325 MG/1
650 TABLET ORAL ONCE
Status: COMPLETED | OUTPATIENT
Start: 2017-07-06 | End: 2017-07-06

## 2017-07-06 RX ORDER — ACETAMINOPHEN 325 MG/1
650 TABLET ORAL ONCE
Status: CANCELLED | OUTPATIENT
Start: 2017-07-06

## 2017-07-06 RX ADMIN — ACETAMINOPHEN 650 MG: 325 TABLET ORAL at 09:33:00

## 2017-07-06 RX ADMIN — DIPHENHYDRAMINE HCL 25 MG: 25 MG CAPSULE ORAL at 09:33:00

## 2017-07-06 NOTE — PROGRESS NOTES
Education Record    Learner:  Patient    Disease / Diagnosis: wegeners    Barriers / Limitations:  None   Comments:    Method:  Brief focused and Discussion   Comments:    General Topics:  Medication, Side effects and symptom management and Plan of care re

## 2017-07-11 ENCOUNTER — OFFICE VISIT (OUTPATIENT)
Dept: HEMATOLOGY/ONCOLOGY | Age: 62
End: 2017-07-11
Attending: INTERNAL MEDICINE
Payer: COMMERCIAL

## 2017-07-11 VITALS
HEART RATE: 86 BPM | TEMPERATURE: 97 F | WEIGHT: 196.63 LBS | OXYGEN SATURATION: 97 % | RESPIRATION RATE: 18 BRPM | DIASTOLIC BLOOD PRESSURE: 86 MMHG | SYSTOLIC BLOOD PRESSURE: 138 MMHG | BODY MASS INDEX: 29 KG/M2

## 2017-07-11 DIAGNOSIS — M31.31 WEGENER'S GRANULOMATOSIS WITH RENAL INVOLVEMENT (HCC): Primary | ICD-10-CM

## 2017-07-11 LAB
BASOPHILS # BLD AUTO: 0.17 X10(3) UL (ref 0–0.1)
BASOPHILS NFR BLD AUTO: 2.3 %
EOSINOPHIL # BLD AUTO: 0.39 X10(3) UL (ref 0–0.3)
EOSINOPHIL NFR BLD AUTO: 5.2 %
ERYTHROCYTE [DISTWIDTH] IN BLOOD BY AUTOMATED COUNT: 15.3 % (ref 11.5–16)
HCT VFR BLD AUTO: 40.1 % (ref 37–53)
HGB BLD-MCNC: 13.2 G/DL (ref 13–17)
IMMATURE GRANULOCYTE COUNT: 0.03 X10(3) UL (ref 0–1)
IMMATURE GRANULOCYTE RATIO %: 0.4 %
LYMPHOCYTES # BLD AUTO: 0.92 X10(3) UL (ref 0.9–4)
LYMPHOCYTES NFR BLD AUTO: 12.2 %
MCH RBC QN AUTO: 31.8 PG (ref 27–33.2)
MCHC RBC AUTO-ENTMCNC: 32.9 G/DL (ref 31–37)
MCV RBC AUTO: 96.6 FL (ref 80–99)
MONOCYTES # BLD AUTO: 0.88 X10(3) UL (ref 0.1–0.6)
MONOCYTES NFR BLD AUTO: 11.7 %
NEUTROPHIL ABS PRELIM: 5.15 X10 (3) UL (ref 1.3–6.7)
NEUTROPHILS # BLD AUTO: 5.15 X10(3) UL (ref 1.3–6.7)
NEUTROPHILS NFR BLD AUTO: 68.2 %
PLATELET # BLD AUTO: 150 10(3)UL (ref 150–450)
RBC # BLD AUTO: 4.15 X10(6)UL (ref 4.3–5.7)
RED CELL DISTRIBUTION WIDTH-SD: 54.4 FL (ref 35.1–46.3)
WBC # BLD AUTO: 7.5 X10(3) UL (ref 4–13)

## 2017-07-11 PROCEDURE — 85025 COMPLETE CBC W/AUTO DIFF WBC: CPT

## 2017-07-11 PROCEDURE — 96415 CHEMO IV INFUSION ADDL HR: CPT

## 2017-07-11 PROCEDURE — 96413 CHEMO IV INFUSION 1 HR: CPT

## 2017-07-11 PROCEDURE — 36415 COLL VENOUS BLD VENIPUNCTURE: CPT

## 2017-07-11 RX ORDER — DIPHENHYDRAMINE HCL 25 MG
25 CAPSULE ORAL ONCE
Status: COMPLETED | OUTPATIENT
Start: 2017-07-11 | End: 2017-07-11

## 2017-07-11 RX ORDER — DIPHENHYDRAMINE HCL 25 MG
25 CAPSULE ORAL ONCE
Status: CANCELLED | OUTPATIENT
Start: 2017-07-11

## 2017-07-11 RX ORDER — ACETAMINOPHEN 325 MG/1
650 TABLET ORAL ONCE
Status: COMPLETED | OUTPATIENT
Start: 2017-07-11 | End: 2017-07-11

## 2017-07-11 RX ORDER — ACETAMINOPHEN 325 MG/1
650 TABLET ORAL ONCE
Status: CANCELLED | OUTPATIENT
Start: 2017-07-11

## 2017-07-11 RX ADMIN — ACETAMINOPHEN 650 MG: 325 TABLET ORAL at 08:18:00

## 2017-07-11 RX ADMIN — DIPHENHYDRAMINE HCL 25 MG: 25 MG CAPSULE ORAL at 08:18:00

## 2017-07-11 NOTE — PROGRESS NOTES
Education Record    Learner:  Patient    Disease / Soraida Deacon granulomatosis    Barriers / Limitations:  None    Method:  Brief focused, printed material and  reinforcement    General Topics:  Plan of care reviewed    Outcome:  Shows understanding

## 2017-08-01 ENCOUNTER — LAB ENCOUNTER (OUTPATIENT)
Dept: LAB | Age: 62
End: 2017-08-01
Attending: INTERNAL MEDICINE
Payer: COMMERCIAL

## 2017-08-01 DIAGNOSIS — M31.31 GLOMERULONEPHRITIS DUE TO WEGENER'S GRANULOMATOSIS: ICD-10-CM

## 2017-08-01 DIAGNOSIS — M31.30 WEGENER'S GRANULOMATOSIS: ICD-10-CM

## 2017-08-01 DIAGNOSIS — Z01.818 PREOP TESTING: ICD-10-CM

## 2017-08-01 LAB
ALBUMIN SERPL-MCNC: 3.3 G/DL (ref 3.5–4.8)
ALP LIVER SERPL-CCNC: 88 U/L (ref 45–117)
ALT SERPL-CCNC: 15 U/L (ref 17–63)
APTT PPP: 33.5 SECONDS (ref 25–34)
AST SERPL-CCNC: 12 U/L (ref 15–41)
BASOPHILS # BLD AUTO: 0.23 X10(3) UL (ref 0–0.1)
BASOPHILS NFR BLD AUTO: 3.1 %
BILIRUB SERPL-MCNC: 0.3 MG/DL (ref 0.1–2)
BILIRUB UR QL STRIP.AUTO: NEGATIVE
BUN BLD-MCNC: 45 MG/DL (ref 8–20)
CALCIUM BLD-MCNC: 9.7 MG/DL (ref 8.3–10.3)
CHLORIDE: 100 MMOL/L (ref 101–111)
CO2: 28 MMOL/L (ref 22–32)
COLOR UR AUTO: YELLOW
CREAT BLD-MCNC: 9.05 MG/DL (ref 0.7–1.3)
EOSINOPHIL # BLD AUTO: 0.48 X10(3) UL (ref 0–0.3)
EOSINOPHIL NFR BLD AUTO: 6.6 %
ERYTHROCYTE [DISTWIDTH] IN BLOOD BY AUTOMATED COUNT: 14.3 % (ref 11.5–16)
GLUCOSE BLD-MCNC: 156 MG/DL (ref 70–99)
GLUCOSE UR STRIP.AUTO-MCNC: 150 MG/DL
HCT VFR BLD AUTO: 44.4 % (ref 37–53)
HGB BLD-MCNC: 13.9 G/DL (ref 13–17)
IMMATURE GRANULOCYTE COUNT: 0.03 X10(3) UL (ref 0–1)
IMMATURE GRANULOCYTE RATIO %: 0.4 %
INR BLD: 0.93 (ref 0.89–1.12)
KETONES UR STRIP.AUTO-MCNC: NEGATIVE MG/DL
LEUKOCYTE ESTERASE UR QL STRIP.AUTO: NEGATIVE
LYMPHOCYTES # BLD AUTO: 1.17 X10(3) UL (ref 0.9–4)
LYMPHOCYTES NFR BLD AUTO: 16 %
M PROTEIN MFR SERPL ELPH: 7.9 G/DL (ref 6.1–8.3)
MCH RBC QN AUTO: 30.3 PG (ref 27–33.2)
MCHC RBC AUTO-ENTMCNC: 31.3 G/DL (ref 31–37)
MCV RBC AUTO: 96.9 FL (ref 80–99)
MONOCYTES # BLD AUTO: 0.97 X10(3) UL (ref 0.1–0.6)
MONOCYTES NFR BLD AUTO: 13.3 %
NEUTROPHIL ABS PRELIM: 4.44 X10 (3) UL (ref 1.3–6.7)
NEUTROPHILS # BLD AUTO: 4.44 X10(3) UL (ref 1.3–6.7)
NEUTROPHILS NFR BLD AUTO: 60.6 %
NITRITE UR QL STRIP.AUTO: NEGATIVE
PH UR STRIP.AUTO: 5 [PH] (ref 4.5–8)
PLATELET # BLD AUTO: 187 10(3)UL (ref 150–450)
POTASSIUM SERPL-SCNC: 4.1 MMOL/L (ref 3.6–5.1)
PROT UR STRIP.AUTO-MCNC: >=500 MG/DL
PSA SERPL DL<=0.01 NG/ML-MCNC: 12.1 SECONDS (ref 11.8–14.1)
RBC # BLD AUTO: 4.58 X10(6)UL (ref 4.3–5.7)
RED CELL DISTRIBUTION WIDTH-SD: 50.8 FL (ref 35.1–46.3)
SODIUM SERPL-SCNC: 139 MMOL/L (ref 136–144)
SP GR UR STRIP.AUTO: 1.01 (ref 1–1.03)
UROBILINOGEN UR STRIP.AUTO-MCNC: <2 MG/DL
WBC # BLD AUTO: 7.3 X10(3) UL (ref 4–13)

## 2017-08-01 PROCEDURE — 85025 COMPLETE CBC W/AUTO DIFF WBC: CPT

## 2017-08-01 PROCEDURE — 83876 ASSAY MYELOPEROXIDASE: CPT

## 2017-08-01 PROCEDURE — 85730 THROMBOPLASTIN TIME PARTIAL: CPT

## 2017-08-01 PROCEDURE — 36415 COLL VENOUS BLD VENIPUNCTURE: CPT

## 2017-08-01 PROCEDURE — 85610 PROTHROMBIN TIME: CPT

## 2017-08-01 PROCEDURE — 81001 URINALYSIS AUTO W/SCOPE: CPT

## 2017-08-01 PROCEDURE — 86256 FLUORESCENT ANTIBODY TITER: CPT

## 2017-08-01 PROCEDURE — 83516 IMMUNOASSAY NONANTIBODY: CPT

## 2017-08-01 PROCEDURE — 80053 COMPREHEN METABOLIC PANEL: CPT

## 2017-08-01 PROCEDURE — 86255 FLUORESCENT ANTIBODY SCREEN: CPT

## 2017-08-02 ENCOUNTER — ANESTHESIA EVENT (OUTPATIENT)
Dept: CARDIAC SURGERY | Facility: HOSPITAL | Age: 62
End: 2017-08-02
Payer: COMMERCIAL

## 2017-08-02 NOTE — ANESTHESIA PREPROCEDURE EVALUATION
PRE-OP EVALUATION    Patient Name: [place] Gomez    Pre-op Diagnosis: end stage renal disease    Procedure(s):  righrt radiocephalic arteriovenous fistula at [facility]  SA pending    Surgeon(s) and Role:     Melvin Garrison MD - Primary    Pre-op vit filtration rate (GFR) between 30-44 mL/min/1.73 square meter and albuminuria creatinine ratio less than 30 mg/g     Wegener's granulomatosis with renal involvement ([facility] [address]     Hypoxia     Acute pulmonary edema (HCC)     Acute renal failure, unspecified acute patient. Comment: D/w the patient the risks and benefits of general anesthesia including sore throat, nausea, and intraoperative awareness.     Plan/risks discussed with: patient                Present on Admission:  **None**

## 2017-08-03 ENCOUNTER — ANESTHESIA (OUTPATIENT)
Dept: CARDIAC SURGERY | Facility: HOSPITAL | Age: 62
End: 2017-08-03
Payer: COMMERCIAL

## 2017-08-04 LAB
MYELOPEROX ANTIBODIES, IGG: 8 AU/ML
SERINE PROTEASE3, IGG: 23 AU/ML

## 2017-08-10 ENCOUNTER — HOSPITAL ENCOUNTER (OUTPATIENT)
Dept: CT IMAGING | Age: 62
Discharge: HOME | End: 2017-08-10
Attending: INTERNAL MEDICINE
Payer: COMMERCIAL

## 2017-08-10 DIAGNOSIS — N18.6 END STAGE RENAL DISEASE (HCC): ICD-10-CM

## 2017-08-10 PROCEDURE — 74176 CT ABD & PELVIS W/O CONTRAST: CPT | Performed by: INTERNAL MEDICINE

## 2017-08-14 ENCOUNTER — HOSPITAL ENCOUNTER (OUTPATIENT)
Facility: HOSPITAL | Age: 62
Setting detail: HOSPITAL OUTPATIENT SURGERY
LOS: 1 days | Discharge: HOME | End: 2017-08-14
Attending: SURGERY | Admitting: SURGERY
Payer: COMMERCIAL

## 2017-08-14 ENCOUNTER — SURGERY (OUTPATIENT)
Age: 62
End: 2017-08-14

## 2017-08-14 VITALS
BODY MASS INDEX: 28.73 KG/M2 | WEIGHT: 194 LBS | DIASTOLIC BLOOD PRESSURE: 80 MMHG | RESPIRATION RATE: 16 BRPM | TEMPERATURE: 98 F | OXYGEN SATURATION: 96 % | HEART RATE: 68 BPM | HEIGHT: 69 IN | SYSTOLIC BLOOD PRESSURE: 143 MMHG

## 2017-08-14 LAB
APTT PPP: 37.2 SECONDS (ref 25–34)
BUN BLD-MCNC: 66 MG/DL (ref 8–20)
CALCIUM BLD-MCNC: 9.3 MG/DL (ref 8.3–10.3)
CHLORIDE: 105 MMOL/L (ref 101–111)
CO2: 22 MMOL/L (ref 22–32)
CREAT BLD-MCNC: 10 MG/DL (ref 0.7–1.3)
GLUCOSE BLD-MCNC: 122 MG/DL (ref 70–99)
INR BLD: 0.99 (ref 0.89–1.11)
POTASSIUM SERPL-SCNC: 4.1 MMOL/L (ref 3.6–5.1)
PSA SERPL DL<=0.01 NG/ML-MCNC: 13.1 SECONDS (ref 12–14.3)
SODIUM SERPL-SCNC: 139 MMOL/L (ref 136–144)

## 2017-08-14 PROCEDURE — 031B09F BYPASS RIGHT RADIAL ARTERY TO LOWER ARM VEIN WITH AUTOLOGOUS VENOUS TISSUE, OPEN APPROACH: ICD-10-PCS | Performed by: SURGERY

## 2017-08-14 PROCEDURE — 93010 ELECTROCARDIOGRAM REPORT: CPT | Performed by: INTERNAL MEDICINE

## 2017-08-14 PROCEDURE — 85610 PROTHROMBIN TIME: CPT | Performed by: SURGERY

## 2017-08-14 PROCEDURE — 93005 ELECTROCARDIOGRAM TRACING: CPT

## 2017-08-14 PROCEDURE — 80048 BASIC METABOLIC PNL TOTAL CA: CPT | Performed by: SURGERY

## 2017-08-14 PROCEDURE — 85730 THROMBOPLASTIN TIME PARTIAL: CPT | Performed by: SURGERY

## 2017-08-14 RX ORDER — MEPERIDINE HYDROCHLORIDE 25 MG/ML
12.5 INJECTION INTRAMUSCULAR; INTRAVENOUS; SUBCUTANEOUS AS NEEDED
Status: DISCONTINUED | OUTPATIENT
Start: 2017-08-14 | End: 2017-08-14

## 2017-08-14 RX ORDER — ONDANSETRON 2 MG/ML
4 INJECTION INTRAMUSCULAR; INTRAVENOUS EVERY 6 HOURS PRN
Status: DISCONTINUED | OUTPATIENT
Start: 2017-08-14 | End: 2017-08-14

## 2017-08-14 RX ORDER — HYDROCODONE BITARTRATE AND ACETAMINOPHEN 5; 325 MG/1; MG/1
1 TABLET ORAL EVERY 4 HOURS PRN
Status: DISCONTINUED | OUTPATIENT
Start: 2017-08-14 | End: 2017-08-14

## 2017-08-14 RX ORDER — HYDROCODONE BITARTRATE AND ACETAMINOPHEN 5; 325 MG/1; MG/1
2 TABLET ORAL AS NEEDED
Status: DISCONTINUED | OUTPATIENT
Start: 2017-08-14 | End: 2017-08-14

## 2017-08-14 RX ORDER — METOCLOPRAMIDE HYDROCHLORIDE 5 MG/ML
10 INJECTION INTRAMUSCULAR; INTRAVENOUS AS NEEDED
Status: DISCONTINUED | OUTPATIENT
Start: 2017-08-14 | End: 2017-08-14

## 2017-08-14 RX ORDER — MORPHINE SULFATE 2 MG/ML
2 INJECTION, SOLUTION INTRAMUSCULAR; INTRAVENOUS
Status: DISCONTINUED | OUTPATIENT
Start: 2017-08-14 | End: 2017-08-14

## 2017-08-14 RX ORDER — HYDROCODONE BITARTRATE AND ACETAMINOPHEN 10; 325 MG/1; MG/1
1 TABLET ORAL EVERY 4 HOURS PRN
Status: DISCONTINUED | OUTPATIENT
Start: 2017-08-14 | End: 2017-08-14

## 2017-08-14 RX ORDER — ACETAMINOPHEN 500 MG
1000 TABLET ORAL ONCE AS NEEDED
Status: DISCONTINUED | OUTPATIENT
Start: 2017-08-14 | End: 2017-08-14

## 2017-08-14 RX ORDER — NALOXONE HYDROCHLORIDE 0.4 MG/ML
80 INJECTION, SOLUTION INTRAMUSCULAR; INTRAVENOUS; SUBCUTANEOUS AS NEEDED
Status: DISCONTINUED | OUTPATIENT
Start: 2017-08-14 | End: 2017-08-14

## 2017-08-14 RX ORDER — BUPIVACAINE HYDROCHLORIDE 5 MG/ML
INJECTION, SOLUTION PERINEURAL AS NEEDED
Status: DISCONTINUED | OUTPATIENT
Start: 2017-08-14 | End: 2017-08-14 | Stop reason: HOSPADM

## 2017-08-14 RX ORDER — HYDROCODONE BITARTRATE AND ACETAMINOPHEN 5; 325 MG/1; MG/1
1 TABLET ORAL EVERY 4 HOURS PRN
Qty: 40 TABLET | Refills: 0 | Status: SHIPPED | OUTPATIENT
Start: 2017-08-14 | End: 2017-09-06

## 2017-08-14 RX ORDER — SODIUM CHLORIDE, SODIUM LACTATE, POTASSIUM CHLORIDE, CALCIUM CHLORIDE 600; 310; 30; 20 MG/100ML; MG/100ML; MG/100ML; MG/100ML
INJECTION, SOLUTION INTRAVENOUS CONTINUOUS
Status: DISCONTINUED | OUTPATIENT
Start: 2017-08-14 | End: 2017-08-14

## 2017-08-14 RX ORDER — ONDANSETRON 2 MG/ML
4 INJECTION INTRAMUSCULAR; INTRAVENOUS AS NEEDED
Status: DISCONTINUED | OUTPATIENT
Start: 2017-08-14 | End: 2017-08-14

## 2017-08-14 RX ORDER — ONDANSETRON 4 MG/1
4 TABLET, ORALLY DISINTEGRATING ORAL EVERY 6 HOURS PRN
Status: DISCONTINUED | OUTPATIENT
Start: 2017-08-14 | End: 2017-08-14

## 2017-08-14 RX ORDER — MORPHINE SULFATE 4 MG/ML
4 INJECTION, SOLUTION INTRAMUSCULAR; INTRAVENOUS
Status: DISCONTINUED | OUTPATIENT
Start: 2017-08-14 | End: 2017-08-14

## 2017-08-14 RX ORDER — HYDROCODONE BITARTRATE AND ACETAMINOPHEN 5; 325 MG/1; MG/1
1 TABLET ORAL AS NEEDED
Status: DISCONTINUED | OUTPATIENT
Start: 2017-08-14 | End: 2017-08-14

## 2017-08-14 RX ORDER — HYDROMORPHONE HYDROCHLORIDE 1 MG/ML
0.4 INJECTION, SOLUTION INTRAMUSCULAR; INTRAVENOUS; SUBCUTANEOUS EVERY 5 MIN PRN
Status: DISCONTINUED | OUTPATIENT
Start: 2017-08-14 | End: 2017-08-14

## 2017-08-14 RX ORDER — MIDAZOLAM HYDROCHLORIDE 1 MG/ML
1 INJECTION INTRAMUSCULAR; INTRAVENOUS EVERY 5 MIN PRN
Status: DISCONTINUED | OUTPATIENT
Start: 2017-08-14 | End: 2017-08-14

## 2017-08-14 RX ORDER — MORPHINE SULFATE 4 MG/ML
8 INJECTION, SOLUTION INTRAMUSCULAR; INTRAVENOUS
Status: DISCONTINUED | OUTPATIENT
Start: 2017-08-14 | End: 2017-08-14

## 2017-08-14 RX ORDER — LABETALOL HYDROCHLORIDE 5 MG/ML
5 INJECTION, SOLUTION INTRAVENOUS EVERY 5 MIN PRN
Status: DISCONTINUED | OUTPATIENT
Start: 2017-08-14 | End: 2017-08-14

## 2017-08-14 RX ORDER — DEXAMETHASONE SODIUM PHOSPHATE 4 MG/ML
4 VIAL (ML) INJECTION AS NEEDED
Status: DISCONTINUED | OUTPATIENT
Start: 2017-08-14 | End: 2017-08-14

## 2017-08-14 RX ORDER — MEPERIDINE HYDROCHLORIDE 25 MG/ML
INJECTION INTRAMUSCULAR; INTRAVENOUS; SUBCUTANEOUS
Status: COMPLETED
Start: 2017-08-14 | End: 2017-08-14

## 2017-08-14 NOTE — ANESTHESIA POSTPROCEDURE EVALUATION
[address] Patient Status:  Outpatient in a Bed   Age/Gender 59year old male MRN [id number]   Location [facility] Attending Yany Montemayor MD   Hosp Day # 1 PCP MD Destinee Ridley

## 2017-08-14 NOTE — OPERATIVE REPORT
Pre-Operative Diagnosis: end stage renal disease     Post-Operative Diagnosis: same     Procedure Performed:   Procedure(s):  right radiocephalic arteriovenous fistula at wrist      Surgeon(s) and Role:     Darrell Renteria MD - Primary    Assistant(s confirmed excellent fistula flow through the vein. There was no active bleeding from the anastomosis. We inspected the remainder of the wound for hemostasis and hemostasis was excellent.   Incision was then closed with interrupted Vicryl  and running Mono

## 2017-08-14 NOTE — BRIEF OP NOTE
Pre-Operative Diagnosis: end stage renal disease     Post-Operative Diagnosis: same     Procedure Performed:   Procedure(s):  right radiocephalic arteriovenous fistula at wrist      Surgeon(s) and Role:     Nan Osullivan MD - Primary    Assistant(s

## 2017-08-15 ENCOUNTER — TELEPHONE (OUTPATIENT)
Dept: NEPHROLOGY | Facility: CLINIC | Age: 62
End: 2017-08-15

## 2017-08-15 LAB
ATRIAL RATE: 74 BPM
P AXIS: 59 DEGREES
P-R INTERVAL: 140 MS
Q-T INTERVAL: 460 MS
QRS DURATION: 94 MS
QTC CALCULATION (BEZET): 510 MS
R AXIS: 30 DEGREES
T AXIS: 114 DEGREES
VENTRICULAR RATE: 74 BPM

## 2017-08-20 NOTE — H&P
[facility]  Vascular Surgery    [place] Patient Status:  Hospital Outpatient Surgery    9/15/1955 MRN [id number]   Location [facility] Attending No att. providers found   Hosp Day # 1 PCP Johnny Camacho unusual skin lesions or rashes  MUSCULOSKELETAL: denies back pain, joint pain, leg swelling  NEURO/EYES: denies headaches, passing out, motor dysfunction, difficulty walking, difficulty with speech, temporary blindness, double vision, confusion    Physical

## 2017-09-02 ENCOUNTER — TELEPHONE (OUTPATIENT)
Dept: NEPHROLOGY | Facility: CLINIC | Age: 62
End: 2017-09-02

## 2017-09-02 NOTE — TELEPHONE ENCOUNTER
Discussed CT findings with pt and borderline + ANCA s/p rituxan; undergoing transplant eval; has appt with pulm later this month- thx dora

## 2017-09-06 PROBLEM — K51.90 ULCERATIVE COLITIS WITHOUT COMPLICATIONS, UNSPECIFIED LOCATION (HCC): Status: ACTIVE | Noted: 2017-09-06

## 2017-11-16 RX ORDER — CINACALCET 30 MG/1
30 TABLET, FILM COATED ORAL
COMMUNITY

## 2017-11-16 RX ORDER — SODIUM CHLORIDE, SODIUM LACTATE, POTASSIUM CHLORIDE, CALCIUM CHLORIDE 600; 310; 30; 20 MG/100ML; MG/100ML; MG/100ML; MG/100ML
INJECTION, SOLUTION INTRAVENOUS CONTINUOUS
Status: CANCELLED | OUTPATIENT
Start: 2017-11-16

## 2017-11-16 RX ORDER — CALCIUM CARBONATE 200(500)MG
1 TABLET,CHEWABLE ORAL DAILY
COMMUNITY

## 2017-11-22 ENCOUNTER — SURGERY (OUTPATIENT)
Age: 62
End: 2017-11-22

## 2017-11-22 ENCOUNTER — HOSPITAL ENCOUNTER (OUTPATIENT)
Facility: HOSPITAL | Age: 62
Setting detail: HOSPITAL OUTPATIENT SURGERY
Discharge: HOME | End: 2017-11-22
Attending: INTERNAL MEDICINE | Admitting: INTERNAL MEDICINE
Payer: MEDICARE

## 2017-11-22 VITALS
HEART RATE: 65 BPM | SYSTOLIC BLOOD PRESSURE: 112 MMHG | RESPIRATION RATE: 16 BRPM | WEIGHT: 195 LBS | OXYGEN SATURATION: 98 % | TEMPERATURE: 99 F | DIASTOLIC BLOOD PRESSURE: 77 MMHG | BODY MASS INDEX: 28.88 KG/M2 | HEIGHT: 69 IN

## 2017-11-22 DIAGNOSIS — K51.90 ULCERATIVE COLITIS WITHOUT COMPLICATIONS, UNSPECIFIED LOCATION (HCC): ICD-10-CM

## 2017-11-22 DIAGNOSIS — N18.6 ESRD (END STAGE RENAL DISEASE) (HCC): ICD-10-CM

## 2017-11-22 PROCEDURE — 0DBM8ZX EXCISION OF DESCENDING COLON, VIA NATURAL OR ARTIFICIAL OPENING ENDOSCOPIC, DIAGNOSTIC: ICD-10-PCS | Performed by: INTERNAL MEDICINE

## 2017-11-22 PROCEDURE — 99152 MOD SED SAME PHYS/QHP 5/>YRS: CPT

## 2017-11-22 PROCEDURE — 99153 MOD SED SAME PHYS/QHP EA: CPT

## 2017-11-22 PROCEDURE — 0DBL8ZX EXCISION OF TRANSVERSE COLON, VIA NATURAL OR ARTIFICIAL OPENING ENDOSCOPIC, DIAGNOSTIC: ICD-10-PCS | Performed by: INTERNAL MEDICINE

## 2017-11-22 PROCEDURE — 88305 TISSUE EXAM BY PATHOLOGIST: CPT | Performed by: INTERNAL MEDICINE

## 2017-11-22 PROCEDURE — 0DBK8ZX EXCISION OF ASCENDING COLON, VIA NATURAL OR ARTIFICIAL OPENING ENDOSCOPIC, DIAGNOSTIC: ICD-10-PCS | Performed by: INTERNAL MEDICINE

## 2017-11-22 RX ORDER — MIDAZOLAM HYDROCHLORIDE 1 MG/ML
INJECTION INTRAMUSCULAR; INTRAVENOUS
Status: DISCONTINUED | OUTPATIENT
Start: 2017-11-22 | End: 2017-11-22

## 2017-11-22 RX ORDER — SODIUM CHLORIDE 9 MG/ML
INJECTION, SOLUTION INTRAVENOUS CONTINUOUS
Status: DISCONTINUED | OUTPATIENT
Start: 2017-11-22 | End: 2017-11-22

## 2017-11-22 NOTE — BRIEF OP NOTE
discharge instructions given to patient are the following . .. 1) diverticulosis was noted (small pouches in the lining of the colon). I recommend a high fiber diet for this. 2) Laxative quality was inadequate to evaluate for colon cancers or polyp.

## 2017-11-22 NOTE — OPERATIVE REPORT
ENDOSCOPY OPERATIVE REPORT    Patient Name:  Hussein Austin  Medical Record #: [id number]  YOB: 1955  Date of Procedure: 11/22/2017    Preoperative Diagnosis:  Ulcerative colitis    Postoperative Diagnosis: diverticulosis, polyps, no co recovered. Diverticular changes of the descending and sigmoid colon were noted. The overall appearance of the mucosa was normal.  Random colon biopsies were taken from every quadrant of the colon every 10 cm and submitted in one container.     At the

## 2017-11-22 NOTE — H&P
Jeremy Olson presents for endoscopy. States no colitis symptoms or abd pain     States he is not willing to use omeprazole or PPI.      Risk of occult ulcer discussed, he demonstrated understanding    The risks and benefits of the procedure were Dr. Chapman  No date: CATH PERCUTANEOUS  TRANSLUMINAL CORONARY ANGIO*      Comment: shayan  2008: COLONOSCOPY  No date: DIALYSIS ACCESS SYSTEM      ROS:  As above. No ha, visual/hearing changes, soar throat.   Denies cp, sob, cough, hematuria, change in energy leve

## 2017-11-27 NOTE — PROGRESS NOTES
Here are the  biopsy/pathology findings from your recent Colonoscopy :      1) diverticulosis was noted (small pouches in the lining of the colon). I recommend a high fiber diet for this.      2) Laxative quality was inadequate to evaluate for colon cancers

## 2018-01-09 RX ORDER — CARVEDILOL 6.25 MG/1
TABLET ORAL
Qty: 60 TABLET | Refills: 5 | Status: SHIPPED | OUTPATIENT
Start: 2018-01-09

## 2018-07-02 ENCOUNTER — TELEPHONE (OUTPATIENT)
Dept: NEPHROLOGY | Facility: CLINIC | Age: 63
End: 2018-07-02

## 2018-07-04 RX ORDER — ZOLPIDEM TARTRATE 5 MG/1
5 TABLET ORAL NIGHTLY PRN
Qty: 30 TABLET | Refills: 11 | Status: SHIPPED | OUTPATIENT
Start: 2018-07-04

## 2018-07-24 ENCOUNTER — HOSPITAL ENCOUNTER (OUTPATIENT)
Dept: CT IMAGING | Age: 63
Discharge: HOME | End: 2018-07-24
Attending: INTERNAL MEDICINE
Payer: MEDICARE

## 2018-07-24 DIAGNOSIS — J84.10 PULMONARY FIBROSIS (HCC): ICD-10-CM

## 2018-07-24 PROCEDURE — 71250 CT THORAX DX C-: CPT | Performed by: INTERNAL MEDICINE

## 2018-07-26 NOTE — PROGRESS NOTES
I'd like to see pt to discuss ct.  I have openings Tomorrow and Monday, please help him schedule an appt

## 2018-07-26 NOTE — PROGRESS NOTES
Received alert to contact [facility] cust service before scheduling an appt. Pt has 2 collection balances. Patient needs to contact collection agency to schedule a payment plan.   [facility] will be notified within 24 hours and then we can schedule a follow up appointm

## 2018-08-22 ENCOUNTER — HOSPITAL ENCOUNTER (OUTPATIENT)
Dept: NUCLEAR MEDICINE | Facility: HOSPITAL | Age: 63
Discharge: HOME | End: 2018-08-22
Attending: INTERNAL MEDICINE
Payer: MEDICARE

## 2018-08-22 DIAGNOSIS — R91.1 LUNG NODULE: ICD-10-CM

## 2018-08-22 LAB — GLUCOSE BLD-MCNC: 91 MG/DL (ref 65–99)

## 2018-08-22 PROCEDURE — 78815 PET IMAGE W/CT SKULL-THIGH: CPT | Performed by: INTERNAL MEDICINE

## 2018-08-22 PROCEDURE — 82962 GLUCOSE BLOOD TEST: CPT

## 2019-01-01 ENCOUNTER — HOSPITAL ENCOUNTER (EMERGENCY)
Age: 64
Discharge: HOME | End: 2019-01-01
Attending: EMERGENCY MEDICINE
Payer: MEDICARE

## 2019-01-01 ENCOUNTER — TELEPHONE (OUTPATIENT)
Dept: NEPHROLOGY | Facility: CLINIC | Age: 64
End: 2019-01-01

## 2019-01-01 VITALS
HEART RATE: 92 BPM | DIASTOLIC BLOOD PRESSURE: 79 MMHG | WEIGHT: 195 LBS | OXYGEN SATURATION: 98 % | SYSTOLIC BLOOD PRESSURE: 160 MMHG | BODY MASS INDEX: 28.88 KG/M2 | RESPIRATION RATE: 18 BRPM | TEMPERATURE: 98 F | HEIGHT: 69 IN

## 2019-01-01 DIAGNOSIS — S69.92XA: Primary | ICD-10-CM

## 2019-01-01 PROCEDURE — 99283 EMERGENCY DEPT VISIT LOW MDM: CPT

## 2019-01-01 PROCEDURE — 90471 IMMUNIZATION ADMIN: CPT

## 2019-01-01 RX ORDER — SEVELAMER CARBONATE 800 MG/1
800 TABLET, FILM COATED ORAL DAILY
Qty: 90 TABLET | Refills: 0 | Status: SHIPPED | OUTPATIENT
Start: 2019-01-01

## 2019-01-01 RX ORDER — SEVELAMER CARBONATE 800 MG/1
TABLET, FILM COATED ORAL
Qty: 90 TABLET | Refills: 0 | OUTPATIENT
Start: 2019-01-01

## 2019-01-01 RX ORDER — CLOPIDOGREL BISULFATE 75 MG/1
75 TABLET ORAL DAILY
COMMUNITY

## 2019-01-01 RX ORDER — SULFAMETHOXAZOLE AND TRIMETHOPRIM 800; 160 MG/1; MG/1
1 TABLET ORAL DAILY
Qty: 7 TABLET | Refills: 0 | Status: SHIPPED | OUTPATIENT
Start: 2019-01-01 | End: 2019-01-01

## 2019-08-29 NOTE — ED PROVIDER NOTES
Patient Seen in: THE [facility] [place]    History   Patient presents with:  FB in Skin (integumentary)    Stated Complaint: fish hook left hand    HPI    Patient is a pleasant 68-year-old male.   Just prior to arrival, patient was josé antonio standard drinks      Comment: occasional    Drug use: No             Review of Systems    Positive for stated complaint: fish hook left hand  Other systems are as noted in HPI. Constitutional and vital signs reviewed.       All other systems reviewed and n taking these medications    Sulfamethoxazole-TMP -160 MG Oral Tab per tablet  Take 1 tablet by mouth daily for 7 days.   Qty: 7 tablet Refills: 0

## 2020-01-01 ENCOUNTER — HOSPITAL ENCOUNTER (INPATIENT)
Facility: HOSPITAL | Age: 65
LOS: 14 days | DRG: 870 | End: 2020-01-01
Attending: EMERGENCY MEDICINE | Admitting: HOSPITALIST
Payer: MEDICARE

## 2020-01-01 ENCOUNTER — APPOINTMENT (OUTPATIENT)
Dept: GENERAL RADIOLOGY | Facility: HOSPITAL | Age: 65
DRG: 870 | End: 2020-01-01
Attending: NURSE PRACTITIONER
Payer: MEDICARE

## 2020-01-01 ENCOUNTER — APPOINTMENT (OUTPATIENT)
Dept: CT IMAGING | Facility: HOSPITAL | Age: 65
DRG: 870 | End: 2020-01-01
Attending: INTERNAL MEDICINE
Payer: MEDICARE

## 2020-01-01 ENCOUNTER — APPOINTMENT (OUTPATIENT)
Dept: CT IMAGING | Facility: HOSPITAL | Age: 65
DRG: 871 | End: 2020-01-01
Attending: INTERNAL MEDICINE
Payer: MEDICARE

## 2020-01-01 ENCOUNTER — APPOINTMENT (OUTPATIENT)
Dept: CV DIAGNOSTICS | Facility: HOSPITAL | Age: 65
DRG: 870 | End: 2020-01-01
Attending: HOSPITALIST
Payer: MEDICARE

## 2020-01-01 ENCOUNTER — APPOINTMENT (OUTPATIENT)
Dept: GENERAL RADIOLOGY | Facility: HOSPITAL | Age: 65
DRG: 870 | End: 2020-01-01
Attending: INTERNAL MEDICINE
Payer: MEDICARE

## 2020-01-01 ENCOUNTER — APPOINTMENT (OUTPATIENT)
Dept: ULTRASOUND IMAGING | Facility: HOSPITAL | Age: 65
DRG: 870 | End: 2020-01-01
Attending: INTERNAL MEDICINE
Payer: MEDICARE

## 2020-01-01 ENCOUNTER — APPOINTMENT (OUTPATIENT)
Dept: GENERAL RADIOLOGY | Facility: HOSPITAL | Age: 65
DRG: 870 | End: 2020-01-01
Attending: HOSPITALIST
Payer: MEDICARE

## 2020-01-01 ENCOUNTER — ANESTHESIA EVENT (OUTPATIENT)
Dept: MEDSURG UNIT | Facility: HOSPITAL | Age: 65
DRG: 870 | End: 2020-01-01
Payer: MEDICARE

## 2020-01-01 ENCOUNTER — APPOINTMENT (OUTPATIENT)
Dept: CV DIAGNOSTICS | Facility: HOSPITAL | Age: 65
DRG: 871 | End: 2020-01-01
Attending: HOSPITALIST
Payer: MEDICARE

## 2020-01-01 ENCOUNTER — APPOINTMENT (OUTPATIENT)
Dept: GENERAL RADIOLOGY | Age: 65
DRG: 870 | End: 2020-01-01
Attending: EMERGENCY MEDICINE
Payer: MEDICARE

## 2020-01-01 ENCOUNTER — TELEPHONE (OUTPATIENT)
Dept: NEPHROLOGY | Facility: CLINIC | Age: 65
End: 2020-01-01

## 2020-01-01 ENCOUNTER — APPOINTMENT (OUTPATIENT)
Dept: GENERAL RADIOLOGY | Facility: HOSPITAL | Age: 65
DRG: 871 | End: 2020-01-01
Attending: EMERGENCY MEDICINE
Payer: MEDICARE

## 2020-01-01 ENCOUNTER — HOSPITAL ENCOUNTER (INPATIENT)
Facility: HOSPITAL | Age: 65
LOS: 3 days | Discharge: HOME | DRG: 871 | End: 2020-01-01
Attending: EMERGENCY MEDICINE | Admitting: HOSPITALIST
Payer: MEDICARE

## 2020-01-01 ENCOUNTER — APPOINTMENT (OUTPATIENT)
Dept: LAB | Age: 65
End: 2020-01-01
Attending: INTERNAL MEDICINE
Payer: MEDICARE

## 2020-01-01 ENCOUNTER — ANESTHESIA (OUTPATIENT)
Dept: MEDSURG UNIT | Facility: HOSPITAL | Age: 65
DRG: 870 | End: 2020-01-01
Payer: MEDICARE

## 2020-01-01 VITALS
BODY MASS INDEX: 29.52 KG/M2 | SYSTOLIC BLOOD PRESSURE: 108 MMHG | HEART RATE: 74 BPM | RESPIRATION RATE: 18 BRPM | DIASTOLIC BLOOD PRESSURE: 48 MMHG | HEIGHT: 69 IN | WEIGHT: 199.31 LBS | OXYGEN SATURATION: 96 % | TEMPERATURE: 98 F

## 2020-01-01 VITALS
RESPIRATION RATE: 24 BRPM | BODY MASS INDEX: 27.13 KG/M2 | HEART RATE: 29 BPM | WEIGHT: 183.19 LBS | OXYGEN SATURATION: 42 % | SYSTOLIC BLOOD PRESSURE: 52 MMHG | TEMPERATURE: 97 F | HEIGHT: 69 IN | DIASTOLIC BLOOD PRESSURE: 40 MMHG

## 2020-01-01 DIAGNOSIS — E87.6 HYPOKALEMIA: ICD-10-CM

## 2020-01-01 DIAGNOSIS — A41.9 SEPSIS, DUE TO UNSPECIFIED ORGANISM, UNSPECIFIED WHETHER ACUTE ORGAN DYSFUNCTION PRESENT (HCC): ICD-10-CM

## 2020-01-01 DIAGNOSIS — R09.02 HYPOXIA: ICD-10-CM

## 2020-01-01 DIAGNOSIS — J18.9 NOSOCOMIAL PNEUMONIA: Primary | ICD-10-CM

## 2020-01-01 DIAGNOSIS — R77.8 ELEVATED TROPONIN: ICD-10-CM
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
## 2020-01-01 DIAGNOSIS — Z99.2 ESRD (END STAGE RENAL DISEASE) ON DIALYSIS (HCC): ICD-10-CM

## 2020-01-01 DIAGNOSIS — J18.9 COMMUNITY ACQUIRED PNEUMONIA, UNSPECIFIED LATERALITY: Primary | ICD-10-CM

## 2020-01-01 DIAGNOSIS — R19.7 DIARRHEA, UNSPECIFIED TYPE: ICD-10-CM

## 2020-01-01 DIAGNOSIS — N18.6 ESRD (END STAGE RENAL DISEASE) ON DIALYSIS (HCC): ICD-10-CM

## 2020-01-01 LAB
ADENOVIRUS PCR:: NEGATIVE
ALBUMIN SERPL-MCNC: 2.6 G/DL (ref 3.4–5)
ALBUMIN SERPL-MCNC: 2.9 G/DL (ref 3.4–5)
ALBUMIN/GLOB SERPL: 0.5 {RATIO} (ref 1–2)
ALBUMIN/GLOB SERPL: 0.6 {RATIO} (ref 1–2)
ALP LIVER SERPL-CCNC: 45 U/L (ref 45–117)
ALP LIVER SERPL-CCNC: 47 U/L (ref 45–117)
ALT SERPL-CCNC: 18 U/L (ref 16–61)
ALT SERPL-CCNC: 20 U/L (ref 16–61)
ANION GAP SERPL CALC-SCNC: 10 MMOL/L (ref 0–18)
ANION GAP SERPL CALC-SCNC: 8 MMOL/L (ref 0–18)
AST SERPL-CCNC: 21 U/L (ref 15–37)
AST SERPL-CCNC: 29 U/L (ref 15–37)
ATRIAL RATE: 112 BPM
B PERT DNA SPEC QL NAA+PROBE: NEGATIVE
BASOPHILS # BLD AUTO: 0.08 X10(3) UL (ref 0–0.2)
BASOPHILS # BLD AUTO: 0.09 X10(3) UL (ref 0–0.2)
BASOPHILS # BLD AUTO: 0.1 X10(3) UL (ref 0–0.2)
BASOPHILS NFR BLD AUTO: 0.5 %
BASOPHILS NFR BLD AUTO: 0.6 %
BASOPHILS NFR BLD AUTO: 0.6 %
BILIRUB SERPL-MCNC: 0.5 MG/DL (ref 0.1–2)
BILIRUB SERPL-MCNC: 0.6 MG/DL (ref 0.1–2)
BILIRUB UR QL STRIP.AUTO: NEGATIVE
BUN BLD-MCNC: 32 MG/DL (ref 7–18)
BUN BLD-MCNC: 41 MG/DL (ref 7–18)
BUN/CREAT SERPL: 4.4 (ref 10–20)
BUN/CREAT SERPL: 5.3 (ref 10–20)
C DIFF TOX B STL QL: POSITIVE
C PNEUM DNA SPEC QL NAA+PROBE: NEGATIVE
CALCIUM BLD-MCNC: 8.3 MG/DL (ref 8.5–10.1)
CALCIUM BLD-MCNC: 8.8 MG/DL (ref 8.5–10.1)
CHLORIDE SERPL-SCNC: 100 MMOL/L (ref 98–112)
CHLORIDE SERPL-SCNC: 95 MMOL/L (ref 98–112)
CLARITY UR REFRACT.AUTO: CLEAR
CO2 SERPL-SCNC: 26 MMOL/L (ref 21–32)
CO2 SERPL-SCNC: 27 MMOL/L (ref 21–32)
COLOR UR AUTO: YELLOW
CORONAVIRUS 229E PCR:: NEGATIVE
CORONAVIRUS HKU1 PCR:: NEGATIVE
CORONAVIRUS NL63 PCR:: NEGATIVE
CORONAVIRUS OC43 PCR:: NEGATIVE
CREAT BLD-MCNC: 7.22 MG/DL (ref 0.7–1.3)
CREAT BLD-MCNC: 7.74 MG/DL (ref 0.7–1.3)
DEPRECATED RDW RBC AUTO: 50.9 FL (ref 35.1–46.3)
DEPRECATED RDW RBC AUTO: 52.8 FL (ref 35.1–46.3)
DEPRECATED RDW RBC AUTO: 52.9 FL (ref 35.1–46.3)
EOSINOPHIL # BLD AUTO: 0.03 X10(3) UL (ref 0–0.7)
EOSINOPHIL # BLD AUTO: 0.08 X10(3) UL (ref 0–0.7)
EOSINOPHIL # BLD AUTO: 0.25 X10(3) UL (ref 0–0.7)
EOSINOPHIL NFR BLD AUTO: 0.2 %
EOSINOPHIL NFR BLD AUTO: 0.5 %
EOSINOPHIL NFR BLD AUTO: 1.8 %
ERYTHROCYTE [DISTWIDTH] IN BLOOD BY AUTOMATED COUNT: 14.8 % (ref 11–15)
ERYTHROCYTE [DISTWIDTH] IN BLOOD BY AUTOMATED COUNT: 14.8 % (ref 11–15)
ERYTHROCYTE [DISTWIDTH] IN BLOOD BY AUTOMATED COUNT: 15.3 % (ref 11–15)
FLUAV RNA SPEC QL NAA+PROBE: NEGATIVE
FLUBV RNA SPEC QL NAA+PROBE: NEGATIVE
GLOBULIN PLAS-MCNC: 4.9 G/DL (ref 2.8–4.4)
GLOBULIN PLAS-MCNC: 5 G/DL (ref 2.8–4.4)
GLUCOSE BLD-MCNC: 113 MG/DL (ref 70–99)
GLUCOSE BLD-MCNC: 115 MG/DL (ref 70–99)
GLUCOSE BLD-MCNC: 153 MG/DL (ref 70–99)
GLUCOSE UR STRIP.AUTO-MCNC: 150 MG/DL
HBV SURFACE AG SER-ACNC: <0.1 [IU]/L
HBV SURFACE AG SERPL QL IA: NONREACTIVE
HCT VFR BLD AUTO: 29.9 % (ref 39–53)
HCT VFR BLD AUTO: 31.4 % (ref 39–53)
HCT VFR BLD AUTO: 32.6 % (ref 39–53)
HGB BLD-MCNC: 10.7 G/DL (ref 13–17.5)
HGB BLD-MCNC: 9.6 G/DL (ref 13–17.5)
HGB BLD-MCNC: 9.9 G/DL (ref 13–17.5)
IMM GRANULOCYTES # BLD AUTO: 0.09 X10(3) UL (ref 0–1)
IMM GRANULOCYTES # BLD AUTO: 0.12 X10(3) UL (ref 0–1)
IMM GRANULOCYTES # BLD AUTO: 0.13 X10(3) UL (ref 0–1)
IMM GRANULOCYTES NFR BLD: 0.6 %
IMM GRANULOCYTES NFR BLD: 0.7 %
IMM GRANULOCYTES NFR BLD: 0.9 %
KETONES UR STRIP.AUTO-MCNC: NEGATIVE MG/DL
L PNEUMO AG UR QL: NEGATIVE
LACTATE SERPL-SCNC: 1.1 MMOL/L (ref 0.4–2)
LYMPHOCYTES # BLD AUTO: 0.74 X10(3) UL (ref 1–4)
LYMPHOCYTES # BLD AUTO: 0.8 X10(3) UL (ref 1–4)
LYMPHOCYTES # BLD AUTO: 0.99 X10(3) UL (ref 1–4)
LYMPHOCYTES NFR BLD AUTO: 4.6 %
LYMPHOCYTES NFR BLD AUTO: 4.9 %
LYMPHOCYTES NFR BLD AUTO: 7.1 %
M PROTEIN MFR SERPL ELPH: 7.6 G/DL (ref 6.4–8.2)
M PROTEIN MFR SERPL ELPH: 7.8 G/DL (ref 6.4–8.2)
MCH RBC QN AUTO: 30.6 PG (ref 26–34)
MCH RBC QN AUTO: 30.6 PG (ref 26–34)
MCH RBC QN AUTO: 30.9 PG (ref 26–34)
MCHC RBC AUTO-ENTMCNC: 31.5 G/DL (ref 31–37)
MCHC RBC AUTO-ENTMCNC: 32.1 G/DL (ref 31–37)
MCHC RBC AUTO-ENTMCNC: 32.8 G/DL (ref 31–37)
MCV RBC AUTO: 94.2 FL (ref 80–100)
MCV RBC AUTO: 95.2 FL (ref 80–100)
MCV RBC AUTO: 96.9 FL (ref 80–100)
METAPNEUMOVIRUS PCR:: NEGATIVE
MONOCYTES # BLD AUTO: 1.21 X10(3) UL (ref 0.1–1)
MONOCYTES # BLD AUTO: 1.36 X10(3) UL (ref 0.1–1)
MONOCYTES # BLD AUTO: 1.5 X10(3) UL (ref 0.1–1)
MONOCYTES NFR BLD AUTO: 8.5 %
MONOCYTES NFR BLD AUTO: 8.7 %
MONOCYTES NFR BLD AUTO: 9.1 %
MYCOPLASMA PNEUMONIA PCR:: NEGATIVE
NEUTROPHILS # BLD AUTO: 11.3 X10 (3) UL (ref 1.5–7.7)
NEUTROPHILS # BLD AUTO: 11.3 X10(3) UL (ref 1.5–7.7)
NEUTROPHILS # BLD AUTO: 13.63 X10 (3) UL (ref 1.5–7.7)
NEUTROPHILS # BLD AUTO: 13.63 X10(3) UL (ref 1.5–7.7)
NEUTROPHILS # BLD AUTO: 13.83 X10 (3) UL (ref 1.5–7.7)
NEUTROPHILS # BLD AUTO: 13.83 X10(3) UL (ref 1.5–7.7)
NEUTROPHILS NFR BLD AUTO: 80.9 %
NEUTROPHILS NFR BLD AUTO: 84.2 %
NEUTROPHILS NFR BLD AUTO: 85.6 %
NITRITE UR QL STRIP.AUTO: NEGATIVE
OSMOLALITY SERPL CALC.SUM OF ELEC: 284 MOSM/KG (ref 275–295)
OSMOLALITY SERPL CALC.SUM OF ELEC: 289 MOSM/KG (ref 275–295)
P AXIS: 36 DEGREES
P-R INTERVAL: 134 MS
PARAINFLUENZA 1 PCR:: NEGATIVE
PARAINFLUENZA 2 PCR:: NEGATIVE
PARAINFLUENZA 3 PCR:: NEGATIVE
PARAINFLUENZA 4 PCR:: NEGATIVE
PH UR STRIP.AUTO: 8 [PH] (ref 4.5–8)
PLATELET # BLD AUTO: 130 10(3)UL (ref 150–450)
PLATELET # BLD AUTO: 149 10(3)UL (ref 150–450)
PLATELET # BLD AUTO: 236 10(3)UL (ref 150–450)
POTASSIUM SERPL-SCNC: 3.3 MMOL/L (ref 3.5–5.1)
POTASSIUM SERPL-SCNC: 4.2 MMOL/L (ref 3.5–5.1)
PROT UR STRIP.AUTO-MCNC: 100 MG/DL
Q-T INTERVAL: 338 MS
QRS DURATION: 94 MS
QTC CALCULATION (BEZET): 461 MS
R AXIS: -2 DEGREES
RBC # BLD AUTO: 3.14 X10(6)UL (ref 3.8–5.8)
RBC # BLD AUTO: 3.24 X10(6)UL (ref 3.8–5.8)
RBC # BLD AUTO: 3.46 X10(6)UL (ref 3.8–5.8)
RBC #/AREA URNS AUTO: >10 /HPF
RHINOVIRUS/ENTERO PCR:: NEGATIVE
RSV RNA SPEC QL NAA+PROBE: NEGATIVE
SODIUM SERPL-SCNC: 132 MMOL/L (ref 136–145)
SODIUM SERPL-SCNC: 134 MMOL/L (ref 136–145)
SP GR UR STRIP.AUTO: 1.01 (ref 1–1.03)
STAPHYLOCOCCUS AUREUS, NOT MRSA BY PCR: DETECTED
T AXIS: 89 DEGREES
TROPONIN I SERPL-MCNC: 0.53 NG/ML (ref ?–0.04)
TROPONIN I SERPL-MCNC: 1.18 NG/ML (ref ?–0.04)
TROPONIN I SERPL-MCNC: 2.52 NG/ML (ref ?–0.04)
UROBILINOGEN UR STRIP.AUTO-MCNC: <2 MG/DL
VENTRICULAR RATE: 112 BPM
WBC # BLD AUTO: 14 X10(3) UL (ref 4–11)
WBC # BLD AUTO: 15.9 X10(3) UL (ref 4–11)
WBC # BLD AUTO: 16.4 X10(3) UL (ref 4–11)

## 2020-01-01 PROCEDURE — 99233 SBSQ HOSP IP/OBS HIGH 50: CPT | Performed by: INTERNAL MEDICINE

## 2020-01-01 PROCEDURE — 71045 X-RAY EXAM CHEST 1 VIEW: CPT | Performed by: EMERGENCY MEDICINE

## 2020-01-01 PROCEDURE — 93306 TTE W/DOPPLER COMPLETE: CPT | Performed by: HOSPITALIST

## 2020-01-01 PROCEDURE — 99232 SBSQ HOSP IP/OBS MODERATE 35: CPT | Performed by: INTERNAL MEDICINE

## 2020-01-01 PROCEDURE — 30233N1 TRANSFUSION OF NONAUTOLOGOUS RED BLOOD CELLS INTO PERIPHERAL VEIN, PERCUTANEOUS APPROACH: ICD-10-PCS | Performed by: INTERNAL MEDICINE

## 2020-01-01 PROCEDURE — 99232 SBSQ HOSP IP/OBS MODERATE 35: CPT | Performed by: HOSPITALIST

## 2020-01-01 PROCEDURE — 99222 1ST HOSP IP/OBS MODERATE 55: CPT | Performed by: INTERNAL MEDICINE

## 2020-01-01 PROCEDURE — 99233 SBSQ HOSP IP/OBS HIGH 50: CPT | Performed by: HOSPITALIST

## 2020-01-01 PROCEDURE — 71045 X-RAY EXAM CHEST 1 VIEW: CPT | Performed by: NURSE PRACTITIONER

## 2020-01-01 PROCEDURE — B548ZZA ULTRASONOGRAPHY OF SUPERIOR VENA CAVA, GUIDANCE: ICD-10-PCS | Performed by: HOSPITALIST

## 2020-01-01 PROCEDURE — 99223 1ST HOSP IP/OBS HIGH 75: CPT | Performed by: HOSPITALIST

## 2020-01-01 PROCEDURE — 87493 C DIFF AMPLIFIED PROBE: CPT

## 2020-01-01 PROCEDURE — 93970 EXTREMITY STUDY: CPT | Performed by: INTERNAL MEDICINE

## 2020-01-01 PROCEDURE — 99223 1ST HOSP IP/OBS HIGH 75: CPT | Performed by: INTERNAL MEDICINE

## 2020-01-01 PROCEDURE — 5A1955Z RESPIRATORY VENTILATION, GREATER THAN 96 CONSECUTIVE HOURS: ICD-10-PCS | Performed by: ANESTHESIOLOGY

## 2020-01-01 PROCEDURE — 0W3P8ZZ CONTROL BLEEDING IN GASTROINTESTINAL TRACT, VIA NATURAL OR ARTIFICIAL OPENING ENDOSCOPIC: ICD-10-PCS | Performed by: INTERNAL MEDICINE

## 2020-01-01 PROCEDURE — 5A1D70Z PERFORMANCE OF URINARY FILTRATION, INTERMITTENT, LESS THAN 6 HOURS PER DAY: ICD-10-PCS | Performed by: INTERNAL MEDICINE

## 2020-01-01 PROCEDURE — 71275 CT ANGIOGRAPHY CHEST: CPT | Performed by: INTERNAL MEDICINE

## 2020-01-01 PROCEDURE — 71045 X-RAY EXAM CHEST 1 VIEW: CPT | Performed by: INTERNAL MEDICINE

## 2020-01-01 PROCEDURE — 0B9H8ZX DRAINAGE OF LUNG LINGULA, VIA NATURAL OR ARTIFICIAL OPENING ENDOSCOPIC, DIAGNOSTIC: ICD-10-PCS | Performed by: INTERNAL MEDICINE

## 2020-01-01 PROCEDURE — 0B9D8ZX DRAINAGE OF RIGHT MIDDLE LUNG LOBE, VIA NATURAL OR ARTIFICIAL OPENING ENDOSCOPIC, DIAGNOSTIC: ICD-10-PCS | Performed by: INTERNAL MEDICINE

## 2020-01-01 PROCEDURE — 3E0G8GC INTRODUCTION OF OTHER THERAPEUTIC SUBSTANCE INTO UPPER GI, VIA NATURAL OR ARTIFICIAL OPENING ENDOSCOPIC: ICD-10-PCS | Performed by: INTERNAL MEDICINE

## 2020-01-01 PROCEDURE — 5A09457 ASSISTANCE WITH RESPIRATORY VENTILATION, 24-96 CONSECUTIVE HOURS, CONTINUOUS POSITIVE AIRWAY PRESSURE: ICD-10-PCS | Performed by: HOSPITALIST

## 2020-01-01 PROCEDURE — 99291 CRITICAL CARE FIRST HOUR: CPT | Performed by: INTERNAL MEDICINE

## 2020-01-01 PROCEDURE — 99239 HOSP IP/OBS DSCHRG MGMT >30: CPT | Performed by: INTERNAL MEDICINE

## 2020-01-01 PROCEDURE — 30233S1 TRANSFUSION OF NONAUTOLOGOUS GLOBULIN INTO PERIPHERAL VEIN, PERCUTANEOUS APPROACH: ICD-10-PCS | Performed by: INTERNAL MEDICINE

## 2020-01-01 PROCEDURE — 90935 HEMODIALYSIS ONE EVALUATION: CPT | Performed by: INTERNAL MEDICINE

## 2020-01-01 PROCEDURE — 71250 CT THORAX DX C-: CPT | Performed by: INTERNAL MEDICINE

## 2020-01-01 PROCEDURE — 71045 X-RAY EXAM CHEST 1 VIEW: CPT | Performed by: HOSPITALIST

## 2020-01-01 PROCEDURE — 02HV33Z INSERTION OF INFUSION DEVICE INTO SUPERIOR VENA CAVA, PERCUTANEOUS APPROACH: ICD-10-PCS | Performed by: HOSPITALIST

## 2020-01-01 PROCEDURE — 99231 SBSQ HOSP IP/OBS SF/LOW 25: CPT | Performed by: HOSPITALIST

## 2020-01-01 PROCEDURE — 0BH18EZ INSERTION OF ENDOTRACHEAL AIRWAY INTO TRACHEA, VIA NATURAL OR ARTIFICIAL OPENING ENDOSCOPIC: ICD-10-PCS | Performed by: ANESTHESIOLOGY

## 2020-01-01 RX ORDER — ZOLPIDEM TARTRATE 5 MG/1
5 TABLET ORAL NIGHTLY PRN
Status: DISCONTINUED | OUTPATIENT
Start: 2020-01-01 | End: 2020-01-01

## 2020-01-01 RX ORDER — POLYETHYLENE GLYCOL 3350 17 G/17G
17 POWDER, FOR SOLUTION ORAL DAILY PRN
Status: DISCONTINUED | OUTPATIENT
Start: 2020-01-01 | End: 2020-01-01

## 2020-01-01 RX ORDER — ALBUTEROL SULFATE 90 UG/1
2 AEROSOL, METERED RESPIRATORY (INHALATION) EVERY 4 HOURS PRN
Status: DISCONTINUED | OUTPATIENT
Start: 2020-01-01 | End: 2020-01-01

## 2020-01-01 RX ORDER — HYDRALAZINE HYDROCHLORIDE 20 MG/ML
10 INJECTION INTRAMUSCULAR; INTRAVENOUS EVERY 6 HOURS PRN
Status: DISCONTINUED | OUTPATIENT
Start: 2020-01-01 | End: 2020-01-01

## 2020-01-01 RX ORDER — CLOPIDOGREL BISULFATE 75 MG/1
75 TABLET ORAL DAILY
Status: DISCONTINUED | OUTPATIENT
Start: 2020-01-01 | End: 2020-01-01

## 2020-01-01 RX ORDER — SODIUM CHLORIDE 9 MG/ML
INJECTION, SOLUTION INTRAVENOUS ONCE
Status: COMPLETED | OUTPATIENT
Start: 2020-01-01 | End: 2020-01-01

## 2020-01-01 RX ORDER — POTASSIUM CHLORIDE 20 MEQ/1
40 TABLET, EXTENDED RELEASE ORAL ONCE
Status: COMPLETED | OUTPATIENT
Start: 2020-01-01 | End: 2020-01-01

## 2020-01-01 RX ORDER — CINACALCET 30 MG/1
30 TABLET, FILM COATED ORAL
Status: DISCONTINUED | OUTPATIENT
Start: 2020-01-01 | End: 2020-01-01

## 2020-01-01 RX ORDER — SEVELAMER CARBONATE 800 MG/1
800 TABLET, FILM COATED ORAL DAILY
Status: DISCONTINUED | OUTPATIENT
Start: 2020-01-01 | End: 2020-01-01

## 2020-01-01 RX ORDER — BISACODYL 10 MG
10 SUPPOSITORY, RECTAL RECTAL
Status: DISCONTINUED | OUTPATIENT
Start: 2020-01-01 | End: 2020-01-01

## 2020-01-01 RX ORDER — LORAZEPAM 2 MG/ML
0.5 INJECTION INTRAMUSCULAR ONCE
Status: DISCONTINUED | OUTPATIENT
Start: 2020-01-01 | End: 2020-01-01

## 2020-01-01 RX ORDER — LISINOPRIL 10 MG/1
10 TABLET ORAL DAILY
Status: DISCONTINUED | OUTPATIENT
Start: 2020-01-01 | End: 2020-01-01

## 2020-01-01 RX ORDER — VANCOMYCIN HYDROCHLORIDE 125 MG/1
125 CAPSULE ORAL EVERY 6 HOURS
Status: DISCONTINUED | OUTPATIENT
Start: 2020-01-01 | End: 2020-01-01

## 2020-01-01 RX ORDER — CHLORHEXIDINE GLUCONATE 0.12 MG/ML
15 RINSE ORAL
Status: DISCONTINUED | OUTPATIENT
Start: 2020-01-01 | End: 2020-01-01 | Stop reason: SDUPTHER

## 2020-01-01 RX ORDER — HYDROCODONE BITARTRATE AND ACETAMINOPHEN 5; 325 MG/1; MG/1
1 TABLET ORAL EVERY 6 HOURS PRN
Status: DISCONTINUED | OUTPATIENT
Start: 2020-01-01 | End: 2020-01-01

## 2020-01-01 RX ORDER — HEPARIN SODIUM 5000 [USP'U]/ML
5000 INJECTION, SOLUTION INTRAVENOUS; SUBCUTANEOUS EVERY 8 HOURS SCHEDULED
Status: DISCONTINUED | OUTPATIENT
Start: 2020-01-01 | End: 2020-01-01

## 2020-01-01 RX ORDER — HYDROMORPHONE HYDROCHLORIDE 1 MG/ML
0.5 INJECTION, SOLUTION INTRAMUSCULAR; INTRAVENOUS; SUBCUTANEOUS EVERY 4 HOURS PRN
Status: DISCONTINUED | OUTPATIENT
Start: 2020-01-01 | End: 2020-01-01

## 2020-01-01 RX ORDER — METHYLPREDNISOLONE SODIUM SUCCINATE 125 MG/2ML
125 INJECTION, POWDER, LYOPHILIZED, FOR SOLUTION INTRAMUSCULAR; INTRAVENOUS EVERY 8 HOURS SCHEDULED
Status: DISCONTINUED | OUTPATIENT
Start: 2020-01-01 | End: 2020-01-01

## 2020-01-01 RX ORDER — DEXMEDETOMIDINE HYDROCHLORIDE 4 UG/ML
INJECTION, SOLUTION INTRAVENOUS CONTINUOUS
Status: DISCONTINUED | OUTPATIENT
Start: 2020-01-01 | End: 2020-01-01

## 2020-01-01 RX ORDER — CALCIUM ACETATE 667 MG/1
1 CAPSULE ORAL
COMMUNITY

## 2020-01-01 RX ORDER — ASPIRIN 81 MG/1
81 TABLET ORAL DAILY
Status: DISCONTINUED | OUTPATIENT
Start: 2020-01-01 | End: 2020-01-01 | Stop reason: SDUPTHER

## 2020-01-01 RX ORDER — LEVOFLOXACIN 5 MG/ML
500 INJECTION, SOLUTION INTRAVENOUS
Status: DISCONTINUED | OUTPATIENT
Start: 2020-01-01 | End: 2020-01-01

## 2020-01-01 RX ORDER — ACETAMINOPHEN 160 MG/5ML
650 SOLUTION ORAL EVERY 6 HOURS PRN
Status: DISCONTINUED | OUTPATIENT
Start: 2020-01-01 | End: 2020-01-01

## 2020-01-01 RX ORDER — ONDANSETRON 2 MG/ML
4 INJECTION INTRAMUSCULAR; INTRAVENOUS EVERY 6 HOURS PRN
Status: DISCONTINUED | OUTPATIENT
Start: 2020-01-01 | End: 2020-01-01

## 2020-01-01 RX ORDER — CEFAZOLIN SODIUM/WATER 2 G/20 ML
2 SYRINGE (ML) INTRAVENOUS DAILY
Status: DISCONTINUED | OUTPATIENT
Start: 2020-01-01 | End: 2020-01-01

## 2020-01-01 RX ORDER — ASPIRIN 81 MG/1
81 TABLET ORAL DAILY
Status: DISCONTINUED | OUTPATIENT
Start: 2020-01-01 | End: 2020-01-01

## 2020-01-01 RX ORDER — ASPIRIN 81 MG/1
81 TABLET ORAL DAILY
COMMUNITY

## 2020-01-01 RX ORDER — MINERAL OIL AND PETROLATUM 150; 830 MG/G; MG/G
OINTMENT OPHTHALMIC 2 TIMES DAILY
Status: DISCONTINUED | OUTPATIENT
Start: 2020-01-01 | End: 2020-01-01

## 2020-01-01 RX ORDER — ACETAMINOPHEN 325 MG/1
650 TABLET ORAL EVERY 6 HOURS PRN
Status: DISCONTINUED | OUTPATIENT
Start: 2020-01-01 | End: 2020-01-01

## 2020-01-01 RX ORDER — CALCIUM ACETATE 667 MG/1
1 CAPSULE ORAL
Status: DISCONTINUED | OUTPATIENT
Start: 2020-01-01 | End: 2020-01-01

## 2020-01-01 RX ORDER — DILTIAZEM HYDROCHLORIDE 5 MG/ML
10 INJECTION INTRAVENOUS ONCE
Status: COMPLETED | OUTPATIENT
Start: 2020-01-01 | End: 2020-01-01

## 2020-01-01 RX ORDER — PHENYLEPHRINE HCL IN 0.9% NACL 50MG/250ML
PLASTIC BAG, INJECTION (ML) INTRAVENOUS
Status: DISPENSED
Start: 2020-01-01 | End: 2020-01-01

## 2020-01-01 RX ORDER — CARVEDILOL 6.25 MG/1
6.25 TABLET ORAL ONCE
Status: COMPLETED | OUTPATIENT
Start: 2020-01-01 | End: 2020-01-01

## 2020-01-01 RX ORDER — SODIUM CHLORIDE 9 MG/ML
INJECTION, SOLUTION INTRAVENOUS ONCE
Status: DISCONTINUED | OUTPATIENT
Start: 2020-01-01 | End: 2020-01-01

## 2020-01-01 RX ORDER — ALBUMIN (HUMAN) 12.5 G/50ML
25 SOLUTION INTRAVENOUS AS NEEDED
Status: DISCONTINUED | OUTPATIENT
Start: 2020-01-01 | End: 2020-01-01

## 2020-01-01 RX ORDER — ALPRAZOLAM 0.25 MG/1
0.25 TABLET ORAL 3 TIMES DAILY PRN
Status: DISCONTINUED | OUTPATIENT
Start: 2020-01-01 | End: 2020-01-01

## 2020-01-01 RX ORDER — SUCRALFATE ORAL 1 G/10ML
1 SUSPENSION ORAL
Status: DISCONTINUED | OUTPATIENT
Start: 2020-01-01 | End: 2020-01-01

## 2020-01-01 RX ORDER — IBUPROFEN 600 MG/1
600 TABLET ORAL ONCE
Status: COMPLETED | OUTPATIENT
Start: 2020-01-01 | End: 2020-01-01

## 2020-01-01 RX ORDER — METOPROLOL SUCCINATE 25 MG/1
25 TABLET, EXTENDED RELEASE ORAL
Qty: 60 TABLET | Refills: 0 | OUTPATIENT
Start: 2020-01-01 | End: 2020-01-01

## 2020-01-01 RX ORDER — ALBUTEROL SULFATE 90 UG/1
2 AEROSOL, METERED RESPIRATORY (INHALATION) ONCE
Status: COMPLETED | OUTPATIENT
Start: 2020-01-01 | End: 2020-01-01

## 2020-01-01 RX ORDER — HEPARIN SODIUM 5000 [USP'U]/ML
5000 INJECTION, SOLUTION INTRAVENOUS; SUBCUTANEOUS EVERY 12 HOURS SCHEDULED
Status: DISCONTINUED | OUTPATIENT
Start: 2020-01-01 | End: 2020-01-01

## 2020-01-01 RX ORDER — ACETAMINOPHEN 500 MG
1000 TABLET ORAL EVERY 6 HOURS PRN
Status: DISCONTINUED | OUTPATIENT
Start: 2020-01-01 | End: 2020-01-01

## 2020-01-01 RX ORDER — MORPHINE SULFATE 4 MG/ML
2 INJECTION, SOLUTION INTRAMUSCULAR; INTRAVENOUS EVERY 2 HOUR PRN
Status: DISCONTINUED | OUTPATIENT
Start: 2020-01-01 | End: 2020-01-01

## 2020-01-01 RX ORDER — COLCHICINE 0.6 MG/1
0.6 TABLET ORAL ONCE
Status: COMPLETED | OUTPATIENT
Start: 2020-01-01 | End: 2020-01-01

## 2020-01-01 RX ORDER — SODIUM CHLORIDE 0.9 % (FLUSH) 0.9 %
10 SYRINGE (ML) INJECTION AS NEEDED
Status: DISCONTINUED | OUTPATIENT
Start: 2020-01-01 | End: 2020-01-01

## 2020-01-01 RX ORDER — LIDOCAINE AND PRILOCAINE 25; 25 MG/G; MG/G
CREAM TOPICAL ONCE
Status: DISCONTINUED | OUTPATIENT
Start: 2020-01-01 | End: 2020-01-01

## 2020-01-01 RX ORDER — LABETALOL HYDROCHLORIDE 5 MG/ML
20 INJECTION, SOLUTION INTRAVENOUS EVERY 4 HOURS PRN
Status: DISCONTINUED | OUTPATIENT
Start: 2020-01-01 | End: 2020-01-01

## 2020-01-01 RX ORDER — BENZONATATE 100 MG/1
100 CAPSULE ORAL 3 TIMES DAILY PRN
Status: DISCONTINUED | OUTPATIENT
Start: 2020-01-01 | End: 2020-01-01

## 2020-01-01 RX ORDER — ACETAMINOPHEN 650 MG/1
650 SUPPOSITORY RECTAL EVERY 6 HOURS PRN
Status: DISCONTINUED | OUTPATIENT
Start: 2020-01-01 | End: 2020-01-01

## 2020-01-01 RX ORDER — ASPIRIN 81 MG/1
81 TABLET, CHEWABLE ORAL DAILY
Status: DISCONTINUED | OUTPATIENT
Start: 2020-01-01 | End: 2020-01-01

## 2020-01-01 RX ORDER — LIDOCAINE HYDROCHLORIDE 20 MG/ML
INJECTION, SOLUTION INFILTRATION; PERINEURAL
Status: DISCONTINUED | OUTPATIENT
Start: 2020-01-01 | End: 2020-01-01

## 2020-01-01 RX ORDER — CARVEDILOL 6.25 MG/1
6.25 TABLET ORAL 2 TIMES DAILY WITH MEALS
Status: DISCONTINUED | OUTPATIENT
Start: 2020-01-01 | End: 2020-01-01

## 2020-01-01 RX ORDER — DILTIAZEM HYDROCHLORIDE 5 MG/ML
10 INJECTION INTRAVENOUS
Status: DISCONTINUED | OUTPATIENT
Start: 2020-01-01 | End: 2020-01-01

## 2020-01-01 RX ORDER — CEFAZOLIN SODIUM/WATER 2 G/20 ML
2 SYRINGE (ML) INTRAVENOUS EVERY 24 HOURS
Status: DISCONTINUED | OUTPATIENT
Start: 2020-01-01 | End: 2020-01-01

## 2020-01-01 RX ORDER — MORPHINE SULFATE 4 MG/ML
4 INJECTION, SOLUTION INTRAMUSCULAR; INTRAVENOUS ONCE
Status: COMPLETED | OUTPATIENT
Start: 2020-01-01 | End: 2020-01-01

## 2020-01-01 RX ORDER — LEVOFLOXACIN 5 MG/ML
750 INJECTION, SOLUTION INTRAVENOUS ONCE
Status: COMPLETED | OUTPATIENT
Start: 2020-01-01 | End: 2020-01-01

## 2020-01-01 RX ORDER — CEFAZOLIN SODIUM/WATER 2 G/20 ML
2 SYRINGE (ML) INTRAVENOUS
Qty: 420 ML | Refills: 0 | Status: SHIPPED | OUTPATIENT
Start: 2020-01-01 | End: 2020-04-08

## 2020-01-01 RX ORDER — DEXTROSE MONOHYDRATE 25 G/50ML
50 INJECTION, SOLUTION INTRAVENOUS
Status: DISCONTINUED | OUTPATIENT
Start: 2020-01-01 | End: 2020-01-01

## 2020-01-01 RX ORDER — MORPHINE SULFATE 4 MG/ML
1 INJECTION, SOLUTION INTRAMUSCULAR; INTRAVENOUS EVERY 2 HOUR PRN
Status: DISCONTINUED | OUTPATIENT
Start: 2020-01-01 | End: 2020-01-01

## 2020-01-01 RX ORDER — CALCIUM CARBONATE 200(500)MG
500 TABLET,CHEWABLE ORAL DAILY
Status: DISCONTINUED | OUTPATIENT
Start: 2020-01-01 | End: 2020-01-01

## 2020-01-01 RX ORDER — HYDROMORPHONE HYDROCHLORIDE 1 MG/ML
1 INJECTION, SOLUTION INTRAMUSCULAR; INTRAVENOUS; SUBCUTANEOUS EVERY 4 HOURS PRN
Status: DISCONTINUED | OUTPATIENT
Start: 2020-01-01 | End: 2020-01-01

## 2020-01-01 RX ORDER — SODIUM CHLORIDE 9 MG/ML
INJECTION, SOLUTION INTRAVENOUS CONTINUOUS
Status: ACTIVE | OUTPATIENT
Start: 2020-01-01 | End: 2020-01-01

## 2020-01-01 RX ORDER — CODEINE PHOSPHATE AND GUAIFENESIN 10; 100 MG/5ML; MG/5ML
5 SOLUTION ORAL EVERY 4 HOURS PRN
Status: DISCONTINUED | OUTPATIENT
Start: 2020-01-01 | End: 2020-01-01

## 2020-01-01 RX ORDER — ALBUMIN (HUMAN) 12.5 G/50ML
100 SOLUTION INTRAVENOUS AS NEEDED
Status: DISCONTINUED | OUTPATIENT
Start: 2020-01-01 | End: 2020-01-01 | Stop reason: SDUPTHER

## 2020-01-01 RX ORDER — CHLORHEXIDINE GLUCONATE 0.12 MG/ML
15 RINSE ORAL
Status: DISCONTINUED | OUTPATIENT
Start: 2020-01-01 | End: 2020-01-01

## 2020-02-22 PROBLEM — J18.9 COMMUNITY ACQUIRED PNEUMONIA: Status: ACTIVE | Noted: 2020-01-01

## 2020-02-23 PROBLEM — A41.9 SEPSIS, DUE TO UNSPECIFIED ORGANISM, UNSPECIFIED WHETHER ACUTE ORGAN DYSFUNCTION PRESENT (HCC): Status: ACTIVE | Noted: 2020-01-01

## 2020-02-23 PROBLEM — J18.9 COMMUNITY ACQUIRED PNEUMONIA, UNSPECIFIED LATERALITY: Status: ACTIVE | Noted: 2020-01-01

## 2020-02-23 PROBLEM — Z99.2 ESRD (END STAGE RENAL DISEASE) ON DIALYSIS (HCC): Status: ACTIVE | Noted: 2020-01-01

## 2020-02-23 PROBLEM — N18.6 ESRD (END STAGE RENAL DISEASE) ON DIALYSIS (HCC): Status: ACTIVE | Noted: 2020-01-01

## 2020-02-23 PROBLEM — A41.9 SEPSIS WITHOUT ACUTE ORGAN DYSFUNCTION (HCC): Status: ACTIVE | Noted: 2020-01-01

## 2020-02-23 PROBLEM — E87.6 HYPOKALEMIA: Status: ACTIVE | Noted: 2020-01-01

## 2020-02-23 NOTE — PLAN OF CARE
Obtained pt from ER at 0445. Pt is a/o x4. Lungs are diminished on room air, sat 96%. HR is NSR on telemetry. Bowel sounds heard in all four quadrants. Pt reports no SOB but slight chest pain when he takes a deep breath.  Pt is laying in bed and call light

## 2020-02-23 NOTE — PROGRESS NOTES
Pharmacy Note: Renal dose adjustment of Levaquin    Danyel Blevins is a 75year old male who has been prescribed Levaquin 500mg IV every 24 hours.   CrCl is 10.1 ml/min so the dose has been adjusted  to [id number] then 500mg IV every 48 hours per [facility]

## 2020-02-23 NOTE — ED INITIAL ASSESSMENT (HPI)
A/O x 4. Patient presents with left sided chest pain that began earlier today, accompanied by shortness of breath that he noticed after dialysis. Patient states that he receives dialysis on Tuesday, Thursday, and Saturday. Pain does not radiate.   Denies

## 2020-02-23 NOTE — CONSULTS
[facility]  Report of Consultation    Niltonsmiley Mondragontierra Patient Status:  Inpatient    9/15/1955 MRN [id number]   [facility] 2NE-A Attending Peewee Alvarez MD   Hosp Day # 0 PCP No primary care provider on file.      Reason for Consu MD at [address] [facility]     Family History   Problem Relation Age of Onset   • Heart Attack Father 66      reports that he quit smoking about 29 years ago. He has never used smokeless tobacco. He reports current alcohol use. He reports that he does not use drugs. Weights  02/23/20 0503 : 198 lb 10.2 oz (90.1 kg)  02/22/20 2220 : 195 lb (88.5 kg)  08/29/19 1627 : 195 lb (88.5 kg)  12/21/17 1316 : 200 lb (90.7 kg)  11/22/17 1222 : 195 lb (88.5 kg)  11/16/17 1534 : 195 lb (88.5 kg)  10/11/17 1030 : 194 lb (88 kg)    G Malb/Cre Calc   Date Value Ref Range Status   08/19/2015 667.2 (H) <=30.0 ug/mg Final       Recent Labs   Lab 02/22/20 2224 02/23/20  0644   WBC 15.9* 16.4*   HGB 10.7* 9.9*   MCV 94.2 96.9   .0* 130.0*       Recent Labs   Lab 02/22/20 2224

## 2020-02-23 NOTE — CONSULTS
[facility]  Cardiology Consultation    Perico Lay Patient Status:  Inpatient    9/15/1955 MRN [id number]   [facility] 2NE-A Attending Andrews Montana MD   Hosp Day # 0 PCP No primary care provider on file.      Reason for Cons MD at [place] [address]     • ESOPHAGOGASTRODUODENOSCOPY (EGD) N/A 4/30/2017    Performed by Sunday Westbrook MD at [address] [place] [address] N/A 8/22/2015    Performed by Dean Feliciano MD at [address] [place] [address] Encounters:  02/23/20 : 198 lb 10.2 oz  08/29/19 : 195 lb  12/21/17 : 200 lb      Physical Exam:   General: Alert and oriented x 3. No apparent distress. No respiratory or constitutional distress. HEENT: Normocephalic, anicteric sclera, neck supple.   Neck Problems:    Hypoxia    Elevated troponin    Anemia in ESRD (end-stage renal disease) ([facility] [address]    Community acquired pneumonia    Sepsis without acute organ dysfunction ([facility] [address]    ESRD (end stage renal disease) on dialysis (HCC)    Hypokalemia  CAD  HTN  HLP

## 2020-02-23 NOTE — PROGRESS NOTES
Pt seen and examined. Still with some left sided pleuritic pain. Feels well otherwise. Trop downtrending. EKG: ST depressions in precordial leads. Will get echo and cards eval.  ? Pericarditis/myocarditis.   He does not appear acutely ill; degree of tro

## 2020-02-23 NOTE — H&P
[facility]  History and Physical     Gadiel Modest Patient Status:  Inpatient    9/15/1955 MRN [id number]   [facility] 2NE-A Attending Courtney Galeano MD   Hosp Day # 0 PCP No primary care provider on file.      Chief Complai that he quit smoking about 29 years ago. He has never used smokeless tobacco. He reports current alcohol use. He reports that he does not use drugs.     Family History:   Family History   Problem Relation Age of Onset   • Heart Attack Father 66       Allerg tenderness or deformity. Abdomen: Soft, nontender, nondistended. Positive bowel sounds. No rebound, guarding or organomegaly. Neurologic: No focal neurological deficits. CNII-XII grossly intact. Musculoskeletal: Moves all extremities.   Extremities: No

## 2020-02-23 NOTE — ED NOTES
Telephone report given to [facility], RN. Pt will be transported to inpatient bed 2617, once the room is ready. Report included ED summary, medications, updated vitals. Pt will be transported with all belongings.  Joao Marcial RN on the total amount of volum

## 2020-02-23 NOTE — SEPSIS REASSESSMENT
[facility]    Sepsis Reassessment Note    BP (!) 136/97   Pulse 100   Temp (!) 101.8 °F (38.8 °C) (Oral)   Resp 26   Ht 175.3 cm (5' 9\")   Wt 88.5 kg   SpO2 94%   BMI 28.80 kg/m²      11:24 PM    Cardiac:  Regularity: Regular  Rate: Normal  Heart S

## 2020-02-23 NOTE — ED PROVIDER NOTES
Patient Seen in: [facility] Emergency Department      History   Patient presents with:  Chest Pain    Stated Complaint: chest pain    HPI    49-year-old male presents emergency room for evaluation of fever and left-sided chest pain.   Patient states ENDOSCOPY   • DIALYSIS ACCESS SYSTEM     • ESOPHAGOGASTRODUODENOSCOPY (EGD) N/A 4/30/2017    Performed by Kerry Rider MD at [address] [facility] GRAFT N/A 8/22/2015    Performed by Madeleine Gann MD at [address] [facility] Result Value    Glucose 153 (*)     Sodium 132 (*)     Potassium 3.3 (*)     Chloride 95 (*)     BUN 32 (*)     Creatinine 7.22 (*)     BUN/CREA Ratio 4.4 (*)     GFR, Non- 7 (*)     GFR, -American 8 (*)     Albumin 2.9 (*)     Globu of IV Levaquin due to history of penicillin allergy. Chest x-ray consistent with pneumonia, patient did require supplemental nasal cannula oxygen as he initially was hypoxic with O2 sat of 88%.   Patient upon presentation had fever of 101.8 orally, patient

## 2020-02-24 NOTE — PROGRESS NOTES
[facility]  Nephrology Progress Note    Sarah Urbina Patient Status:  Inpatient    9/15/1955 MRN [id number]   [facility] 2NE-A Attending Trung Beaulieu MD   Hosp Day # 1 PCP No primary care provider on file.        SUBJECTIVE: Labs   Lab 02/23/20  0756   PGLU 115*       Meds:   [START ON 2/25/2020] ceFAZolin sodium (ANCEF/KEFZOL) 2 GM/20ML premix IV syringe 2 g, 2 g, Intravenous, Q24H  aspirin EC tab 81 mg, 81 mg, Oral, Daily  carvedilol (COREG) tab 6.25 mg, 6.25 mg, Oral, BID w

## 2020-02-24 NOTE — PROGRESS NOTES
[facility]  Cardiology Progress Note    Subjective:  No chest pain or shortness of breath.     Objective:  /62 (BP Location: Left arm)   Pulse 81   Temp 98.3 °F (36.8 °C) (Oral)   Resp 20   Ht 5' 9\" (1.753 m)   Wt 198 lb 10.2 oz (90.1 kg)   SpO2 ID/hospitalist  · +Methacillin susceptible staphylococcus aureus septicemia: abx per ID  · NSVT: on coreg.    · Decreased LVEF 35-40%  · Moderate-severe mitral regurgitation  · Elevated troponin  · CAD: hx CABG x3 in 2015  · HTN: BP stable  · HLD  · ESRD on

## 2020-02-24 NOTE — PLAN OF CARE
Assumed care at 2100. C/o L chest pain when taking deep breaths-- relief with PRN IV Morphine. IV Rocephin q24h. R arm fistula noted with (+) thrill and (+) bruit. 0300- lab called with positive blood culture results. hospitalist notified.    0500-

## 2020-02-24 NOTE — PROGRESS NOTES
BCx + MSSA  Repeat bcx now  Cont IV abx, await finalized  Echo pending, may need YAHIR  Consider ID eval, will defer to AM hospitalist    Evelia Herrera MD

## 2020-02-24 NOTE — PLAN OF CARE
Received bedside report on this Pt., at 1530. Pt., awake, A&Ox4, calm, pleasant and cooperative. Pt., is in SR per Tele monitor, sats greater than 92% on 2L Oxygen per NC, sats were 85% on RA.  PRN Tylenol administered per Pt., request for left side pain,

## 2020-02-24 NOTE — PROGRESS NOTES
[facility]  Progress Note     [place] Patient Status:  Inpatient    9/15/1955 MRN [id number]   [facility] 2NE-A Attending Renato Earl MD   Hosp Day # 1 PCP No primary care provider on file.      Chief Complaint: PNA Sevelamer Carbonate  800 mg Oral Daily   • Heparin Sodium (Porcine)  5,000 Units Subcutaneous 2 times per day       ASSESSMENT / PLAN:     1. Left Sided Pleuritic Chest Pain  1. Not consistent with ACS  2. Echo pending  3. Better with morphine  4.  Trial of

## 2020-02-24 NOTE — PLAN OF CARE
Assumed patient care at 0730. Vital signs stable. Patient alert and oriented x 4. See flowsheets and EMAR for pain documentation.     Problem: RESPIRATORY - ADULT  Goal: Achieves optimal ventilation and oxygenation  Description  INTERVENTIONS:  - Assess

## 2020-02-24 NOTE — PROGRESS NOTES
Pharmacy Note:  Renal Adjustment for cefazolin (ANCEF)         Perico Lay is a 75year old male who has been prescribed cefazolin 2g q8hr.       Est CrCl: on HD T/Th/Sat    The dose has been adjusted to cefazolin 2g q24hr per hospital renal dose

## 2020-02-24 NOTE — CONSULTS
INFECTIOUS DISEASE CONSULT NOTE    Yennifer Patton Patient Status:  Inpatient    9/15/1955 MRN [id number]   [facility] 2NE-A Attending Kayla Elder MD   Hosp Day # 1 PCP No stormy MD FRANK at [address] [facility] GRAFT N/A 8/22/2015    Performed by Mary Kay Montesinos MD at [address]     Family History   Problem Relation Age of Onset   • Heart Attack Father 66      reports that he quit smoking about 29 years ago.  He dougherty bleeding  Musculoskeletal: Negative for myalgias, arthralgias, arthritis. Neurological: Negative for headaches, dizziness, focal neuro deficits  Behavioral/Psych: Negative for anxiety or depression    Physical Exam:    General: No acute distress.  Alert an bases consistent with infiltrate or atelectasis. The mid and upper zones are clear. There is moderate cardiac enlargement. Sternal wires are noted. There is pulmonary hypo inflation.       CONCLUSION:  Patchy densities left-greater-than-right both lung

## 2020-02-25 NOTE — PLAN OF CARE
Pt is A&Ox4. 2L NC saturating 98%. NSR on tele, denies cardiovascular symptoms. Continent B&B. Up ad puja. HD scheduled for tomorrow. POC updated with pt, he verbalized understanding. Will monitor.         Problem: RESPIRATORY - ADULT  Goal: Achieves opt response to interventions for patient's volume status, including labs, urine output, blood pressure (other measures as available)  - Encourage oral intake as appropriate  - Instruct patient on fluid and nutrition restrictions as appropriate  Outcome: Progr

## 2020-02-25 NOTE — PLAN OF CARE
Assumed care for pt at 0700  Aox4, calm, cooperative, independent, up ad puja. NSR on cardiac monitor. Adequate saturation on 2 liters NS. Lung sounds diminished.  Denies SOB, chest discomfort, n/v. C/o left sided pain with cough and hiccupping-Tylenol seems Monitor labs and assess for signs and symptoms of volume excess or deficit  - Monitor intake, output and patient weight  - Monitor urine specific gravity, serum osmolarity and serum sodium as indicated or ordered  - Monitor response to interventions for pa

## 2020-02-25 NOTE — PROGRESS NOTES
[facility]  Cardiology Progress Note    Subjective:  No chest pain or shortness of breath.     Objective:  /60 (BP Location: Left arm)   Pulse 67   Temp 98.1 °F (36.7 °C) (Oral)   Resp 18   Ht 5' 9\" (1.753 m)   Wt 199 lb 4.7 oz (90.4 kg)   SpO2 downtrending and so far now signs of acute coronary syndrome.   Will need eventually LHC at [facility] for evaluation of decrease in LVEF and part of kidney transplant w/u.  Michael Miranda MD

## 2020-02-25 NOTE — PROGRESS NOTES
[facility]  Progress Note     Kari Benoit Patient Status:  Inpatient    9/15/1955 MRN [id number]   [facility] 2NE-A Attending Alba Ortega MD   Hosp Day # 2 PCP No primary care provider on file.      Chief Complaint: PNA • lisinopril  10 mg Oral Daily   • Sevelamer Carbonate  800 mg Oral Daily   • Heparin Sodium (Porcine)  5,000 Units Subcutaneous 2 times per day       ASSESSMENT / PLAN:     1. Staph PNA and staph sepsis  1. Cont. ancef  2.  Symptomatically much better  3

## 2020-02-25 NOTE — PROGRESS NOTES
[facility]  Nephrology Progress Note    Beverely List Patient Status:  Inpatient    9/15/1955 MRN [id number]   [facility] 2NE-A Attending Sandy Ruby MD   Hosp Day # 2 PCP No primary care provider on file.        SUBJECTIVE: 115*       Meds:   ceFAZolin sodium (ANCEF/KEFZOL) 2 GM/20ML premix IV syringe 2 g, 2 g, Intravenous, Q24H  epoetin donita-epbx (RETACRIT) 28331 UNIT/ML injection 10,000 Units, 10,000 Units, Intravenous, Once in dialysis  aspirin EC tab 81 mg, 81 mg, Oral, D

## 2020-02-25 NOTE — PROGRESS NOTES
INFECTIOUS DISEASE PROGRESS NOTE    Jordan Murphy Patient Status:  Inpatient    9/15/1955 MRN [id number]   [facility] 2NE-A Attending Juice Blanco MD   [address] Day # 2 PCP No pr Supple. Respiratory: decreased BS and rales at L base. No wheezes. Cardiovascular: S1, S2.  Regular rate and rhythm. No murmurs. Abdomen: Soft, nontender, nondistended. Positive bowel sounds. Musculoskeletal: Full range of motion of all extremities. tolerate cephalosporins, does not recall allergies to any other drugs    PLAN:    - cover with ancef as organism is mssa, no need for additional abx for pna, assume pna also be due to mssa  - follow Bcx to doc clearance  - follow wbc to doc clearance of le

## 2020-02-26 NOTE — PROGRESS NOTES
[facility]  Nephrology Progress Note    Varghese Myers Patient Status:  Inpatient    9/15/1955 MRN [id number]   [facility] 2NE-A Attending Lou Powers MD   Hosp Day # 3 PCP No primary care provider on file.        SUBJECTIVE: Recent Labs   Lab 02/23/20  0756   PGLU 115*       Meds:   ceFAZolin sodium (ANCEF/KEFZOL) 2 GM/20ML premix IV syringe 2 g, 2 g, Intravenous, Q24H  aspirin EC tab 81 mg, 81 mg, Oral, Daily  carvedilol (COREG) tab 6.25 mg, 6.25 mg, Oral, BID with meal

## 2020-02-26 NOTE — PLAN OF CARE
Plan for possible discharge today  ID to manage antibiotics  Ambulatory  No c/o pain  Slight SOB with activity  Patient refusing sq heprin \"dialysis is serious I do not have it\"    Left side of neck lymph node seems appeared

## 2020-02-26 NOTE — PROGRESS NOTES
[facility]  Cardiology Progress Note    Subjective:  No chest pain or shortness of breath.     Objective:  /43 (BP Location: Left arm)   Pulse 69   Temp 97.7 °F (36.5 °C) (Oral)   Resp 18   Ht 5' 9\" (1.753 m)   Wt 199 lb 4.7 oz (90.4 kg)   SpO2

## 2020-02-26 NOTE — PLAN OF CARE
Pt denies c/o malaise or cardiac symptoms. A&Ox4. Lungs diminished bilaterally with equal expansion. Pt in normal sinus rhythm with equal rate. Abdomen soft and non-tender with active bowel sounds in all four quadrants.  AV fistula on right forearm, no comp symptoms of volume excess or deficit  - Monitor intake, output and patient weight  - Monitor urine specific gravity, serum osmolarity and serum sodium as indicated or ordered  - Monitor response to interventions for patient's volume status, including labs,

## 2020-02-26 NOTE — PROGRESS NOTES
INFECTIOUS DISEASE PROGRESS NOTE    Warden Fofana Patient Status:  Inpatient    9/15/1955 MRN [id number]   [facility] 2NE-A Attending Eri Collier MD   [address] Day # 3 PCP No pr Regular rate and rhythm. No murmurs. Abdomen: Soft, nontender, nondistended. Positive bowel sounds. Musculoskeletal: Full range of motion of all extremities. Mild non pitting edema RUE  Integument: No rash. No open wounds.  R AVF site w/o signs of infe y/o. Jeovany Ledesma that he has been able to tolerate cephalosporins, does not recall allergies to any other drugs    PLAN:    - cover with ancef as organism is mssa, no need for additional abx for pna, assume pna also be due to mssa  - ok for home later today on

## 2020-02-26 NOTE — DISCHARGE SUMMARY
[facility]PITAL[facility]  DISCHARGE SUMMARY     Yocasta Coley Patient Status:  Inpatient    9/15/1955 MRN [id number]   [facility] 2NE-A Attending No att. providers found   Hosp Day # 3 PCP No primary care provider on file.      Date of Admi as: COREG      TAKE 1 TABLET BY MOUTH TWO TIMES A DAY WITH MEALS   Quantity:  60 tablet  Refills:  5     Clopidogrel Bisulfate 75 MG Tabs  Commonly known as:  PLAVIX      Take 75 mg by mouth daily.    Refills:  0     lisinopril 10 MG Tabs      TAKE 1 TABLE DISCHARGE INSTRUCTIONS: See electronic chart    Kym Rodriguez MD    Time spent:  > 30 minutes

## 2020-03-23 PROBLEM — J18.9 NOSOCOMIAL PNEUMONIA: Status: ACTIVE | Noted: 2020-01-01
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
## 2020-03-23 NOTE — PLAN OF CARE
Received pt at 1530 from  ER. Pt was sob but denied sob. Pt c/o pain, to both lung sides. Norco given, stated no pain relief. About 1700 pt started desat. To low 80's . Pt placed on Hi Flow 8L  and pt stated relief sats  97%.  All questions and concerns a

## 2020-03-23 NOTE — CONSULTS
[address] [facility] Patient Status:  Inpatient    9/15/1955 MRN [id number]   [facility] 3SW-A Attending Ramandeep Madrid MD   Hosp Day # 0 PCP None Pcp     Date of Admission: 3/23/2020  8:48 AM  Admission Diagnosis: Hypox Eri Vasquez MD at [place] [address] [address]     • ESOPHAGOGASTRODUODENOSCOPY (EGD) N/A 4/30/2017    Performed by Dian Marin MD at [address] [place] [address] N/A 8/22/2015    Performed by Camilo Lafleur MD at [facility] constipation, melena, abdominal pain  : denies hematuria, dysuria, hesitancy, or incontinence  Musculoskeletal: denies arthralgias, myalgias, muscle weakness, or joint swelling  Skin: denies rash or pruritis; no jaundice  Neurologic: denies numbness, wea 03/23/2020    MCV 97.5 03/23/2020    MCH 29.5 03/23/2020    MCHC 30.2 03/23/2020    RDW 15.4 03/23/2020    .0 03/23/2020     Lab Results   Component Value Date     03/23/2020    K 4.4 03/23/2020    CL 97 03/23/2020    CO2 29.0 03/23/2020    BU

## 2020-03-23 NOTE — H&P
[facility]  History and Physical     Vizcaino Peers Patient Status:  Inpatient    9/15/1955 MRN [id number]   [facility] 3SW-A Attending Carlos Segal MD   Hosp Day # 0 PCP None Pcp     Chief Complaint: SOB    History of Pr CVOR   • AV FISTULA REVISION, OPEN Left 08/43/0282    Rifgt cephalic fistula   • CABG  8/22/15    triple, [facility],  4   • CATH PERCUTANEOUS  TRANSLUMINAL CORONARY ANGIOPLASTY      poba   • COLONOSCOPY  2008   • COLONOSCOPY N/A 11/22/2017    Perf 30 mg by mouth daily with breakfast., Disp: , Rfl:       Physical Exam:    BP (!) 167/83   Pulse 96   Temp 97.5 °F (36.4 °C) (Temporal)   Resp 20   Ht 175.3 cm (5' 9\")   Wt 192 lb (87.1 kg)   SpO2 93%   BMI 28.35 kg/m²   General: No acute distress.  Alert full  · Cummings: no  Plan of care discussed with patient     Roge De Oliveira MD    **Certification      PHYSICIAN Certification of Need for Inpatient Hospitalization - Initial Certification    Patient will require inpatient services that will reasonably be ex

## 2020-03-23 NOTE — CONSULTS
[facility]  Report of Consultation    Maine Aguirre Patient Status:  Inpatient    9/15/1955 MRN [id number]   [facility] 3SW-A Attending Taylor Cota MD   Hosp Day # 0 PCP None Pcp     Reason for Consultation:  ESRD/pneumonia ESOPHAGOGASTRODUODENOSCOPY (EGD) N/A 4/30/2017    Performed by Nic Boyd MD at [address] [place] [address] GRAFT N/A 8/22/2015    Performed by Idania Stout MD at [facility] CVOR     Family History   Problem Relation Age of Onset   • Hea coarse BS L base    Abdomen: Soft, non-tender. + bowel sounds, no palpable organomegaly  Extremities: Without clubbing, cyanosis or edema.   R AVF with brut/thrill  Neurologic: moving all extremities  Skin: Warm and dry, no rashes      Laboratory Data:  Lab pneumonia. He does not feel sx at all suggestive of prior vasculitis. Has been on ancef with recent vanco and received meropenem in ED. ID/pulm to eval.  RVP and poss COVID testing as well. Will check MPO/PR3 ab titers    #2. ESRD- due to GPA.   Does

## 2020-03-23 NOTE — PROGRESS NOTES
[facility] Pharmacy Note:  Renal Adjustment for meropenem (MERREM)    Alba Ochoa is a 75year old male who has been prescribed meropenem (MERREM) 500 mg every 8 hrs.   Patient has ESRD and is on hemodialysis so the dose has been adjusted to meropenem (ME

## 2020-03-23 NOTE — PLAN OF CARE
NURSING ADMISSION NOTE    Patient admitted via ambulance  Oriented to room. Safety precautions initiated. Bed in low position. Call light in reach. Pt placed on droplet and contact enhanced isolation.

## 2020-03-23 NOTE — ED PROVIDER NOTES
Patient Seen in: THE [facility] Emergency Department In [facility]      History   Patient presents with:  Dyspnea GODWIN SOB    Stated Complaint: SOB/cough/wheezing    HPI    26-year-old male presents for evaluation of shortness of breath.   Patient has been short o (EGD) N/A 4/30/2017    Performed by Kerry Rider MD at [address] N/A 8/22/2015    Performed by Madeleine Gann MD at [facility] CVOR                    Social History    Tobacco Use      Smoking status: Former Smoker GFR, -American 7 (*)     AST 10 (*)     ALT <6 (*)     Albumin 2.5 (*)     Globulin  5.4 (*)     A/G Ratio 0.5 (*)     All other components within normal limits   CBC W/ DIFFERENTIAL - Abnormal; Notable for the following components:    WBC 13.3 PATIENT STATED HISTORY: (As transcribed by Technologist)  Patient complains of shortness of breath since yesterday. Patient was diagnosed with pneumonia two weeks ago and patient stated he does not feel like he completely recovered.  Patient is taking Vanco

## 2020-03-23 NOTE — ED INITIAL ASSESSMENT (HPI)
C/o SOB starting Sunday - on Vanco for cdiff.    Has not taken vanco before   Started vanco on Saturday   Reports dry cough

## 2020-03-24 NOTE — CM/SW NOTE
03/24/20 1000   CM/SW Referral Data   Referral Source Physician   Informant Spouse   Social History   Recreational Drug/Alcohol Use no   Major Changes Last 6 Months no   Domestic/Partner Violence no   Suicidal Ideation/Depression/Mental Health Issues no

## 2020-03-24 NOTE — CONSULTS
INFECTIOUS DISEASE CONSULT NOTE    Jordan Murphy Patient Status:  Inpatient    9/15/1955 MRN [id number]   [facility] 3SW-A Attending Gisell Lopez MD   Hosp Day # 0 PCP None P by Sunday Westbrook MD at [facility] ENDOSCOPY   • HEART CORONARY ARTERY BYPASS GRAFT N/A 8/22/2015    Performed by Dean Feliciano MD at [facility] CVOR     Family History   Problem Relation Age of Onset   • Heart Attack Father 66   • Other (Other) Mother       reports t ulcers  Respiratory: Negative for  sputum production,wheezing  Cardiovascular: Negative for chest pain, syncope, lower extremity edema. Gastrointestinal: Negative for dysphagia,  nausea, vomiting, diarrhea, abdominal pain  : esrd on HD.  Does not make mu 13:32.  INDICATIONS:  Pneumonia. History of Francis's granulomatosis. TECHNIQUE:  Unenhanced multislice CT scanning is performed through the chest.  Dose reduction techniques were used.  Dose information is transmitted to the [facility] ([address] [place] [address],First Floor multiple nonenlarged mediastinal lymph nodes noted. CARDIAC:  Extensive coronary artery calcifications are present. Patient is status post sternotomy. There is cardiomegaly. No pericardial effusion.  PLEURA:  Left pleural effusion measures 2.7 cm in AP d he does not feel like he completely recovered. Patient is taking Vanco for c-diff. Patient is a dialysis patient. Patient had triple bypass surgery in 2014.     FINDINGS:  Patchy airspace disease involving the right mid and lower lung which is new since pre exam  · Check esr and crp in am  · Case and plan d/w pt and staff  Thank you for allowing me to participate in the care of this patient. Please do not hesitate to call if you have any questions.    I will continue to follow with you and will make further re

## 2020-03-24 NOTE — PROGRESS NOTES
03/23/20 2015   Provider Notification   Reason for Communication Review case   Provider Name Other (comment)  (Mauricioargabrielle)   Method of Communication Page   Response Phone call;See orders       Md paged for elevated /77, per patient he didn't take pm sc

## 2020-03-24 NOTE — PROGRESS NOTES
[address]  Patient Status:  Inpatient    9/15/1955 MRN [id number]   [facility] 4SW-A Attending Jakub Weinstein MD   [facility] Day # 1 PCP None Pcp     Critical Care Progress Note     Date of Admission: 3/23/2020  8:4 Pulse 71   Temp 96.9 °F (36.1 °C) (Temporal)   Resp 17   Ht 5' 9\" (1.753 m)   Wt 186 lb 1.6 oz (84.4 kg)   SpO2 97%   BMI 27.48 kg/m²        Wt Readings from Last 3 Encounters:  03/24/20 : 186 lb 1.6 oz (84.4 kg)  02/25/20 : 199 lb 4.7 oz (90.4 kg)  08/29 respiratory failure: 2/2 PNA, less likely vasculitis   -CXR with increasing right sided infiltrates  -wean O2 as able, if oxygen requirements continue to increase would have low threshold to proceed with intubation    -checking vasculitis markers   -repeat

## 2020-03-24 NOTE — PROGRESS NOTES
Patient received from [facility] with increased oxygen requirements.   Is positive for Adenovirus but COVID-19 sent and pending per ID      ABG ordered  Recent Labs   Lab 03/24/20  0045   ABGPHT 7.45   QDKAUY5T 40   RDSVW3N 74*   ABGHCO3 27.1*   ABGBE 2.7*   T

## 2020-03-24 NOTE — PROGRESS NOTES
[facility]  Nephrology Progress Note    Dee Brady Patient Status:  Inpatient    9/15/1955 MRN [id number]   [facility] 4SW-A Attending Sancho Thao MD   Hosp Day # 1 PCP None Pcp       SUBJECTIVE:  Worsening hypoxia overni Lab 03/23/20  0922   ALT <6*   AST 10*   ALB 2.5*       No results for input(s): PGLU in the last 168 hours.     Meds:   benzonatate (TESSALON) cap 100 mg, 100 mg, Oral, TID PRN  guaiFENesin-codeine (ROBITUSSIN AC) 100-10 MG/5ML syrup 5 mL, 5 mL, Oral, Q4 overloaded on exam or by CXR. HD today and to cont TTHS     #3. Anemia- in setting of ESRD and recent infection.  hgb sig lower than last admit.   Will cont ESAs.       #4.  c diff colitis- on po vanco    Questions/concerns were discussed with patient and

## 2020-03-24 NOTE — PROGRESS NOTES
[facility]  Progress Note     Triston Locke Patient Status:  Inpatient    9/15/1955 MRN [id number]   [facility] 4SW-A Attending Jason Mayberry MD   Hosp Day # 2 PCP None Pcp     Chief Complaint: Adenovirus    S: Patient mando • Clopidogrel Bisulfate  75 mg Oral Daily   • meropenem  500 mg Intravenous Q24H   • Heparin Sodium (Porcine)  5,000 Units Subcutaneous Q8H [address]   • calcium acetate  1 capsule Oral TID CC       ASSESSMENT / PLAN:     1.  Acute hypoxic respiratory failure d

## 2020-03-24 NOTE — DIETARY NOTE
[address] [facility]     Admitting diagnosis:  Hypoxia [R09.02]  Nosocomial pneumonia [J18.9, Y95]    Ht: 175.3 cm (5' 9\")  Wt: 84.4 kg (186 lb 1.6 oz). This is 116 % of IBW  Body mass index is 27.48 kg/m².   IBW: 72

## 2020-03-24 NOTE — PROGRESS NOTES
03/23/20 2005   Provider Notification   Reason for Communication Critical value   Test/Procedure Name Respiratory Panel Positive   Provider Name Christine Jackson MD   Method of Communication Call   Response Phone call; No new orders   Notification Tayo Pascual 411

## 2020-03-24 NOTE — PLAN OF CARE
Received pt AAO x4. Continues on HFNC 6-8L. Afebrile. NSR on monitor. Tolerating diet per order. No BM. Urinal at bedside. HD: 2.8L removed. Family updated on status and plan of care. Will continue to monitor.

## 2020-03-24 NOTE — PROGRESS NOTES
INFECTIOUS DISEASE PROGRESS NOTE    Dallin Batres Patient Status:  Inpatient    9/15/1955 MRN [id number]   [facility] 3SW-A Attending Dorian Reardon MD   Hosp Day # 1 PCP None Exam:  Not examined due to Covid isolation protocol, chart reviewed and d/w other MDs    Laboratory Data:    Recent Labs   Lab 03/23/20  0922 03/24/20  0429   RBC 2.75* 2.52*   HGB 8.1* 7.5*   HCT 26.8* 25.6*   MCV 97.5 101.6*   MCH 29.5 29.8   MCHC 30.2* previously measured 18 x 11 mm. Calcified pleural based nodule in the right lung base is stable consistent with a granuloma. Previously demonstrated 8 mm left lower lobe lung nodule is obscured by left lower lobe consolidation.   There are no new lung nod edema.  3. There has been interval decrease in size of multiple lung nodules. The findings may reflect granuloma in this patient with a history of phlegmonous granulomatosis. 4. Cardiomegaly. Status post CABG.   Extensive coronary artery calcifications ar at 10:04 AM          ASSESSMENT:    1. Acute hypoxic resp failure- cxr worse than last admission, suggestive of a new R sided pna. VRP is + for adenovirus, but pt with severe hypoxia raising concerns for Covid 19 infection    2.  Pneumonia- VRP + for [employer]

## 2020-03-24 NOTE — CM/SW NOTE
MSW left a message for the patient's spouse iLlly Ayala to complete discharge planning assessment and discuss recent contacts of the patient due to R/O COVID-19. Awaiting response from wife.     Paul Diamond LCSW

## 2020-03-24 NOTE — PLAN OF CARE
Assumed care at 2130. Around 2215, tatient appears very SOB, 02 sats b/w 87 - 89, now requiring more than 10L of 02 high flow. Currently on 11L high flow. /91, Labetolol PRN given BP WNL.  Transferring to ICU per orders from Dr. Blanche Lopez if patient requ

## 2020-03-24 NOTE — PLAN OF CARE
Assumed care of pt around 0030. A+Ox4, neuro intact. Pt c/o chronic back pain 6/10, PRN acetaminophen given as ordered. Afebrile. NSR. SBP maintained <150. Received pt on 11 L HFNC, sats > 95%, will titrate O2 as appropriate.  Respirations labored, accessor

## 2020-03-24 NOTE — PROGRESS NOTES
Received patient alert and orientated. Oxygen WNL on 8L HF, only conversational dyspnea. No complaints of pain, medications given per MAR. Care endorsed to new RN at 2130.  Germaine Sanchez       [phone]

## 2020-03-24 NOTE — PROGRESS NOTES
[facility]  Progress Note     Winsome Escobar Patient Status:  Inpatient    9/15/1955 MRN [id number]   [facility] 4SW-A Attending Anderson Rodarte MD   Hosp Day # 1 PCP None Pcp     Chief Complaint: SOB  S:  Overnight:  Worsen higher suspicion- COVID pending   1. ID following  2. Ferritin, LDH, PCT, CRP  3. Recent MSSA bacteremia- as above   4. +C diff on 3/18- vanc Q6H  5. ESRD on HD T/R/Sa- Renal consulted. HD todau   6. Wegener's- Pulm consult   7.  Anemia no signs of acute bl

## 2020-03-25 NOTE — PROGRESS NOTES
INFECTIOUS DISEASE PROGRESS NOTE    [facility] List Patient Status:  Inpatient    9/15/1955 MRN [id number]   [facility] 3SW-A Attending Kit MD Js   Hosp Day # 2 PCP None calcium acetate (PHOSLO) cap 667 mg, 1 capsule, Oral, TID CC  •  Labetalol HCl (TRANDATE) injection 20 mg, 20 mg, Intravenous, Q4H PRN      Physical Exam:  Not examined due to Covid isolation protocol, chart reviewed and d/w other MDs and with RN    Labor infection    2. Pneumonia- VRP + for adenovirus but pt recently in the hospital so should cover for nosocomial pna as well. With severe hypoxia will also r/o Covid 19    3. Cdiff colitis - on po vanco. Better    4.  Recent MSSA septicemia with suspected LLL

## 2020-03-25 NOTE — PLAN OF CARE
Assumed care at 1. Alert. See flowsheet for further assessment. Received on 8L NC. Weaning O2 as tolerated. (-) COVID -- ID notified. Droplet for adenovirus. SR. VSS. General diet.

## 2020-03-25 NOTE — PLAN OF CARE
Assumed care of patient at 0730. Pt A&Ox4. O2 sats have been in the 90s all day on 5-3L NC. Pt was up in chair for lunch and walked to bathroom w/ 1 assist had to increase O2 after the walk to 5L once at rest oxygen levels increased.  Pt has denied any edd

## 2020-03-25 NOTE — PROGRESS NOTES
[facility]  Nephrology Progress Note    Danyel Blevins Patient Status:  Inpatient    9/15/1955 MRN [id number]   [facility] 4SW-A Attending Deanna Herrera MD   Hosp Day # 2 PCP None Pcp       SUBJECTIVE:  Stable this AM although 10.30* 7.69*   CA 8.8 8.6 8.8   * 140* 112*       Recent Labs   Lab 03/23/20  0922 03/24/20  0429 03/25/20  0514   ALT <6*  --  <6*   AST 10*  --  14*   ALB 2.5*  --  2.1*   LDH  --  199 178       No results for input(s): PGLU in the last 168 hours. presentation.      #2. ESRD- due to GPA. Does not appear volume overloaded on exam or by CXR. HD to cont TTHS     #3. Anemia- in setting of ESRD and recent infection.  hgb sig lower than last admit.   Will cont ESAs.       #4.  c diff colitis- on po Midge Muff

## 2020-03-25 NOTE — PROGRESS NOTES
[address]  Patient Status:  Inpatient    9/15/1955 MRN [id number]   [facility] 4SW-A Attending Griffin Chicas MD   [facility] Day # 2 PCP None Pcp     Critical Care Progress Note     Date of Admission: 3/23/2020  8:4 HYDROcodone-acetaminophen (NORCO) 5-325 MG per tab 1 tablet, 1 tablet, Oral, Q6H PRN  •  calcium acetate (PHOSLO) cap 667 mg, 1 capsule, Oral, TID CC  •  Labetalol HCl (TRANDATE) injection 20 mg, 20 mg, Intravenous, Q4H PRN     OBJECTIVE:  /60   Puls 08/07/2013    INR 0.93 08/01/2017    INR 0.99 04/29/2017    INR 1.10 04/09/2017           Imaging: I have independently visualized all relevant chest imaging in PACS. I agree with the radiology interpretation except where noted. ASSESSMENT/PLAN:  1.

## 2020-03-26 NOTE — PLAN OF CARE
Verbalizes understanding of goal of going home without oxygen therapy. Assumed care of patient at [address]. Pt remains on 2 LNC without c/o SOB. VSS. Pt requested Ambien and rested quietly with eyes closed, easily awakened for majority of shift.

## 2020-03-26 NOTE — VASCULAR ACCESS
Consulted to place a triple lumen PICC line. Dr Eliza Watts and Suma Chahal informed and agree with placement of Triple PICC line.

## 2020-03-26 NOTE — PLAN OF CARE
Received pt this AM. A&Ox4 with no complaints of pain. Tolerated 5L NC most of day until this afternoon when pt attempted to get OOB. Became tachypneic and tachycardic and O2 sats dropped. Pt increased to 10L NC and is now on 8L.  For a brief moment he seem

## 2020-03-26 NOTE — PROGRESS NOTES
[facility]  Nephrology Progress Note    Dee Brady Patient Status:  Inpatient    9/15/1955 MRN [id number]   [facility] 4SW-A Attending Sancho Thao MD   Hosp Day # 3 PCP None Pcp       SUBJECTIVE:  Remains SOB today and mo 4.4 5.4* 4.8 4.4   CL 97* 98 99 97*   CO2 29.0 28.0 27.0 25.0   BUN 41* 58* 47* 75*   CREATSERUM 8.53* 10.30* 7.69* 10.20*   CA 8.8 8.6 8.8 8.8   * 140* 112* 118*       Recent Labs   Lab 03/23/20  7257 03/24/20  0429 03/25/20  0514 03/26/20  0434 PRN          Impression/Plan:       #1. SOB/cough- presentation c/w pneumonia and noted to be adenovirus +.  clinical picture not c/w vasculitis but noted to have GA-3 ab level at 68. Not clear whether this represents GPA flare, however.   Infection would

## 2020-03-26 NOTE — PROGRESS NOTES
Pulmonary Progress Note        NAME: Ulysses Gomez - ROOM: [facility]455-A - MRN: [id number] - Age: 75year old - : 9/15/1955        Last 24hrs: No events overnight, this AM became more tachypnic, no results on COVID at this time    OBJECTIVE:    2. PNA:  -right mid/lower lung field infiltrates  -PR3  -check urinary strep/legionella - if able to obtain a sample  -RVP notable for adenovirus  -PCT mildly elevated of unclear significance in setting of ESRD  -on empiric meropenem   -initial COVID was

## 2020-03-26 NOTE — PROGRESS NOTES
INFECTIOUS DISEASE PROGRESS NOTE    Gadiel Modest Patient Status:  Inpatient    9/15/1955 MRN [id number]   [facility] 3SW-A Attending Stephy Doran MD   Hosp Day # 3 PCP None PRN  •  calcium acetate (PHOSLO) cap 667 mg, 1 capsule, Oral, TID CC  •  Labetalol HCl (TRANDATE) injection 20 mg, 20 mg, Intravenous, Q4H PRN      Physical Exam:  Not examined due to Covid isolation protocol, chart reviewed and d/w other MDs and with RN airspace  at the mid-lower lung. Suspect small bilateral effusion. Stable cardiomegaly. Vascularity indistinct. No sign of pneumothorax. CONCLUSION: Increasing changes of pneumonia on the right, to the right upper lobe.  Stable milder changes at the left

## 2020-03-26 NOTE — PROGRESS NOTES
[facility]  Progress Note     Daniellenelli Gary Patient Status:  Inpatient    9/15/1955 MRN [id number]   [facility] 4SW-A Attending Mali Fuchs MD   Hosp Day # 3 PCP None Pcp     Chief Complaint: Adenovirus    S: Patient mando Juni Leslie (MRN [id number]) as of 3/26/2020 17:44   Ref.  Range 3/24/2020 04:29 3/25/2020 05:14 3/26/2020 04:34   C-REACTIVE PROTEIN Latest Ref Range: <0.30 mg/dL 9.92 (H) 15.10 (H) 18.80 (H)     Results for Jeff Vásquez (MRN [id number]) as of 3/26/20 hypertension  1. Aspirin  2. Plavix  3. Coreg  4. Lisinopril on hold   5. Hydralazine & Labetalol PRN  6. Monitor hemodynamics  13. ESRD on HD, T-Th-Sat, right AVF  1. HD per nephrology  14. Recent MSSA bacteremia  15.  Wegener's granulomatosis    Quality:

## 2020-03-27 NOTE — CM/SW NOTE
Care Progression Note:  Active Acute Medical Issue:   Acute hypoxic respiratory failure  Adenovirus  Pneumonia  Cdif    COVID negative x 2. On vanco for cdif colitis. Continuation of regular HD schedule. On 8L NC, may need bronch.       Other Contributi

## 2020-03-27 NOTE — PROGRESS NOTES
INFECTIOUS DISEASE PROGRESS NOTE    Marisela Snow Patient Status:  Inpatient    9/15/1955 MRN [id number]   [facility] 3SW-A Attending Juan Barnard MD   Hosp Day # 4 PCP None 1,000 mg, 1,000 mg, Oral, Q6H PRN  •  ondansetron HCl (ZOFRAN) injection 4 mg, 4 mg, Intravenous, Q6H PRN  •  zolpidem (AMBIEN) tab 5 mg, 5 mg, Oral, Nightly PRN  •  HYDROcodone-acetaminophen (NORCO) 5-325 MG per tab 1 tablet, 1 tablet, Oral, Q6H PRN  •  c 03/23/20  9:23 AM   Result Value Ref Range    Blood Culture Result No Growth 3 Days N/A       Radiology:        reviewed    ASSESSMENT:    1. Acute hypoxic resp failure- cxr worse than last admission, suggestive of a new R sided pna.  VRP is + for adenoviru

## 2020-03-27 NOTE — PROGRESS NOTES
Pulmonary Progress Note        NAME: Jordan Murphy - ROOM: 311/695-N - MRN: [id number] - Age: 75year old - : 9/15/1955        Last 24hrs: Increased O2 requirements overnight. Continues to remain slightly tachypneic with cough and minimal sputum. O2 requirement; if decreasing will consider bronch with BAL  2.  PNA:  -right mid/lower lung field infiltrates  -PR3  -check urinary strep/legionella - if able to obtain a sample  -RVP notable for adenovirus  -PCT mildly elevated of unclear significance in

## 2020-03-27 NOTE — PLAN OF CARE
Received pt this am a/o x 4, resting comfortably in bed. NPO this am for possible bronch. CT this afternoon; results to pulm. Plan to add steroids for now. Still with possible bronch tomorrow.      Became SOB with increased wob this evening, see note, now o

## 2020-03-27 NOTE — PROGRESS NOTES
[facility]  Nephrology Progress Note    Wayna Course Patient Status:  Inpatient    9/15/1955 MRN [id number]   [facility] 4SW-A Attending Rubi Slaughter MD   Hosp Day # 4 PCP None Pcp       SUBJECTIVE:  Still SOB, requiring mor 03/24/20  0429 03/25/20  0514 03/26/20  0434 03/27/20  0350    136 134* 132* 134*   K 4.4 5.4* 4.8 4.4 4.3   CL 97* 98 99 97* 99   CO2 29.0 28.0 27.0 25.0 25.0   BUN 41* 58* 47* 75* 47*   CREATSERUM 8.53* 10.30* 7.69* 10.20* 6.71*   CA 8.8 8.6 8.8 8. Nightly PRN  HYDROcodone-acetaminophen (NORCO) 5-325 MG per tab 1 tablet, 1 tablet, Oral, Q6H PRN  calcium acetate (PHOSLO) cap 667 mg, 1 capsule, Oral, TID CC  Labetalol HCl (TRANDATE) injection 20 mg, 20 mg, Intravenous, Q4H PRN          Impression/Plan:

## 2020-03-27 NOTE — PLAN OF CARE
Patient is awake, alert and oriented x4. On 8 L HFNC, weaned down to 4 L NC. Dyspnea with exertion present. . Kept NPO after midnight for possible bronch. Vital signs are stable at this time. Will continue to monitor the patient. At [id number] [facility] Noticed that patient

## 2020-03-27 NOTE — PLAN OF CARE
Increased hr, rr, wob. NC increased to 10 without much change. Pt also c/o back pain and attempting to sit in tripod position. Pt repositioned and HOB at 45 degrees. Prn pain medication given for back pain.        Call to np and rt to place pt on bipap for

## 2020-03-27 NOTE — PROGRESS NOTES
[facility]  Progress Note     Dee Brady Patient Status:  Inpatient    9/15/1955 MRN [id number]   [facility] 4SW-A Attending Sancho Thao MD   Hosp Day # 4 PCP None Pcp     Chief Complaint: Adenovirus    S: Patient mando 16:59   Ref. Range 3/24/2020 04:29 3/25/2020 05:14 3/26/2020 04:34 3/27/2020 03:50   C-REACTIVE PROTEIN Latest Ref Range: <0.30 mg/dL 9.92 (H) 15.10 (H) 18.80 (H) 16.90 (H)     Results for Gudelia Garcia (MRN [id number]) as of 3/27/2020 16:59   Ref. hypertension  1. Aspirin  2. Plavix  3. Coreg  4. Lisinopril resumed  5. Monitor hemodynamics  13. ESRD on HD, T-Th-Sat, right AVF  1. HD per nephrology  14. Recent MSSA bacteremia  15.  Wegener's granulomatosis    Quality:  · DVT Prophylaxis: Heparin given

## 2020-03-28 NOTE — PLAN OF CARE
Resumed care of patient at 0730  Pt is A&Ox4. O2 has been stable on 8L HF NC. HD done today 2L were taken off with vitals stable. Able to get up to the bedside commode with assistance. Bronch on hold for now pt on a low sodium carb count diet.            Pr

## 2020-03-28 NOTE — PLAN OF CARE
Pt is tolerating bipap with intermittent breaks of HFNC for oral care and toileting. Pt continues to have CRUZ.  Pt desired to ambulate to the bathroom to use the toilet and stand at the sink but verbalized understanding of need to use the BSC instead to min repeat lab results as appropriate  - Fluid restriction as ordered  - Instruct patient on fluid and nutrition restrictions as appropriate  Outcome: Progressing

## 2020-03-28 NOTE — PROGRESS NOTES
[facility]                INFECTIOUS DISEASE PROGRESS NOTE    Warden Fofana Patient Status:  Inpatient    9/15/1955 MRN [id number]   [facility] 4SW-A Attending Jai Johnson MD   Hosp Day # 5 PCP None Pcp     Antibiotics: reviewed:  Patient Active Problem List:     ESRD (end stage renal disease) ([facility] [address]     Non-ST elevated myocardial infarction ([facility] [address]     Chronic kidney disease (CKD) stage G3b/A1, moderately decreased glomerular filtration rate (GFR) between 30-44 mL/min/1.73

## 2020-03-28 NOTE — PROGRESS NOTES
[facility]  Progress Note     Crystal Chauhan Patient Status:  Inpatient    9/15/1955 MRN [id number]   [facility] 4SW-A Attending Edgardo Najera MD   Hosp Day # 5 PCP None Pcp     Chief Complaint: Adenovirus    S: Patient mando (H)).    Recent Labs   Lab 03/28/20  0542   PTP 15.8*   INR 1.22*       Recent Labs   Lab 03/23/20  0922   TROP <0.045       Imaging: Imaging data reviewed in Epic.     Medications:   • MethylPREDNISolone Sodium Succ  125 mg Intravenous [facility]   • edgar

## 2020-03-28 NOTE — PROGRESS NOTES
Pulmonary Progress Note        NAME: Winsome Escobar - ROOM: 072/517-D - MRN: [id number] - Age: 75year old - : 9/15/1955        Last 24hr: No acute events overnight. Patient is currently on Bipap 50% saturating 100%.  Notes improved cough without sp which shows diffuse GGO with small focus of consolidation in RML    ASSESSMENT/PLAN:  1.  Acute hypoxemic respiratory failure: 2/2 PNA, less likely vasculitis   -CXR with significant right sided infiltrates  -wean O2 as able, currently on Bipap 40%  -Hold o

## 2020-03-28 NOTE — PROGRESS NOTES
[facility]  Nephrology Progress Note    Sherry Savage Patient Status:  Inpatient    9/15/1955 MRN [id number]   [facility] 4SW-A Attending Griffin Chicas MD   Hosp Day # 5 PCP None Pcp       SUBJECTIVE:  Was on bipap overnight n 319.0 309.0  --  255.0   INR  --   --   --   --  1.22*  --        Recent Labs   Lab 03/24/20  0429 03/25/20  0514 03/26/20  0434 03/27/20  0350 03/28/20  0542    134* 132* 134* 133*   K 5.4* 4.8 4.4 4.3 4.9   CL 98 99 97* 99 98   CO2 28.0 27.0 25.0 2 Bisulfate (PLAVIX) tab 75 mg, 75 mg, Oral, Daily  Meropenem (MERREM) 500 mg in sodium chloride 0.9% 100 mL MBP, 500 mg, Intravenous, Q24H  Heparin Sodium (Porcine) 5000 UNIT/ML injection 5,000 Units, 5,000 Units, Subcutaneous, Q8H [address]  acetaminophen (TYLEN

## 2020-03-28 NOTE — PLAN OF CARE
Assumed care of pt around 2330. Received report from [facility]. Upon initial assessment pt resting in bed on bipap, precedex infusing. Pt alert and oriented x4, resting comfortably at this time. NPO @ 0000 for possible bronch.  Titrated precedex down, pt t

## 2020-03-29 PROBLEM — J12.0 ADENOVIRUS PNEUMONIA: Status: ACTIVE | Noted: 2020-01-01

## 2020-03-29 NOTE — PLAN OF CARE
Pt A & O. No complaints. Extra HD today- [facility] notified. Pt transferring to medical floor.

## 2020-03-29 NOTE — PROGRESS NOTES
[facility]  Nephrology Progress Note    Danielle Gary Patient Status:  Inpatient    9/15/1955 MRN [id number]   [facility] 4SW-A Attending Mali Fuchs MD   Hosp Day # 6 PCP None Pcp       SUBJECTIVE:  Somewhat less SOB this A Recent Labs   Lab 03/25/20  0514 03/26/20  0434 03/27/20  0350 03/28/20  0542 03/29/20  0510   * 132* 134* 133* 133*   K 4.8 4.4 4.3 4.9 5.2*   CL 99 97* 99 98 97*   CO2 27.0 25.0 25.0 22.0 27.0   BUN 47* 75* 47* 83* 70*   CREATSERUM 7.69* 10.2 Q24H  Heparin Sodium (Porcine) 5000 UNIT/ML injection 5,000 Units, 5,000 Units, Subcutaneous, Q8H [facility]  acetaminophen (TYLENOL EXTRA STRENGTH) tab 1,000 mg, 1,000 mg, Oral, Q6H PRN  ondansetron HCl (ZOFRAN) injection 4 mg, 4 mg, Intravenous, Q6H PRN  zolpid

## 2020-03-29 NOTE — PLAN OF CARE
Patient received @1900. Medications given per MAR. See flowsheet for assessment details. Patient on 8L HF NC and weaning as tolerated with no issues. Up to bedside commode with standby. Will monitor.

## 2020-03-29 NOTE — PROGRESS NOTES
Asked to reevaluate pt for hypoxemia and tachypnea. Pt has had increasing O2 requirement up to 10 LPM.   Denies chest pain. No hemoptysis. HD had to be interrupted due to loss of venous access.    Pt admantly refuses subq heparin since he had been erik per renal.   F/u ABG. D/w'd pt, questions answered. My best regards,     Qasim Ordaz MD  Pulmonary, Critical Care and Sleep Medicine.

## 2020-03-29 NOTE — RESPIRATORY THERAPY NOTE
Pt cannot tolerate BiPAP at this time; breathing in the high 40's (RR). Pt placed back on 10L HFNC. Will attempt later.

## 2020-03-29 NOTE — PROGRESS NOTES
Pulmonary Progress Note        NAME: Jeremy Olson - ROOM: 584/441-H - MRN: [id number] - Age: 75year old - : 9/15/1955        Last 24hr: No acute events overnight. Patient was weaned to 5L O2 overnight. Notes improvement in dyspnea.  Cough without hypoxemic respiratory failure: 2/2 PNA vs. vasculitis  -CXR with significant right sided infiltrates  -wean O2 as able, currently on 4L.  He has done very well with IV steroids  -Hold off on bronch at this time  -Continue solumedrol 125mg q8h x 3 days (star

## 2020-03-29 NOTE — PROGRESS NOTES
[facility]  Progress Note     Dee Brady Patient Status:  Inpatient    9/15/1955 MRN [id number]   [facility] 4NW-A Attending Avtar Kelsey MD   Hosp Day # 6 PCP None Pcp     Chief Complaint: cough    S: Patient with some AST 14* 16 25  --   --    ALT <6* <6* 7*  --   --    BILT 0.4 0.4 0.3  --   --    TP 8.1 7.9 8.4*  --   --        Estimated Creatinine Clearance: 10.8 mL/min (A) (based on SCr of 6.73 mg/dL (H)).     Recent Labs   Lab 03/28/20  0542   PTP 15.8*   INR 1.2 discharge to: TBD     Plan of care discussed with Daryn Weinstein MD        ADDENDUM:  NOTIFIED BY RN THAT PT IS NOW REQUIRING 10L NC, RECEIVING HD, RR IN 30S. REFUSED HEPARIN SINCE ADMISSION. ABG, CXR ORDERED AND REVIEWED.  PULMONOLOGIST Colleen Liu

## 2020-03-30 NOTE — DIETARY NOTE
[address] [facility]     Admitting diagnosis:  Hypoxia [R09.02]  Nosocomial pneumonia [J18.9, Y95]    Ht: 175.3 cm (5' 9\")  Wt: 80.3 kg (177 lb 0.5 oz). This is 116 % of IBW  Body mass index is 26.14 kg/m².   IBW: 72

## 2020-03-30 NOTE — PLAN OF CARE
Pt. Transferring from ICU around 1300. Pt. On 5L O2 via hi-bhumika NC. No c/o SOB. Plan to have HD today. Call light within reach. Will continue to monitor. 1630: Pt. With labored breathing and increased O2 needs. Pt. O2 sats in mid 80's.  Pt. Stating it demetrius Electrolytes maintained within normal limits  Description  INTERVENTIONS:  - Monitor labs and rhythm and assess patient for signs and symptoms of electrolyte imbalances  - Administer electrolyte replacement as ordered  - Monitor response to electrolyte rep

## 2020-03-30 NOTE — PROGRESS NOTES
[facility]  Nephrology Progress Note    Triston Locke Patient Status:  Inpatient    9/15/1955 MRN [id number]   [facility] 4SW-A Attending Jason Mayberry MD   Hosp Day # 7 PCP None Pcp       SUBJECTIVE:      Frustrated about cherri PRN  HYDROcodone-acetaminophen (NORCO) 5-325 MG per tab 1 tablet, 1 tablet, Oral, Q6H PRN  calcium acetate (PHOSLO) cap 667 mg, 1 capsule, Oral, TID CC  Labetalol HCl (TRANDATE) injection 20 mg, 20 mg, Intravenous, Q4H PRN            Physical Exam:   BP 15 noted to be adenovirus +.  clinical picture entirely not c/w vasculitis (hx GPA) but noted to have AL-3 ab level at 68 and CT does demonstrate diffuse ground glass opacities as well as focal pneumonia RML.   COVD-19 neg  - remains on IV abx  - IV steroids i

## 2020-03-30 NOTE — PROGRESS NOTES
[facility] Patient Status:  Inpatient    9/15/1955 MRN [id number]   [facility] 4NW-A Attending Gissel Ernst MD   Hosp Day # 7 PCP None Pcp     SUBJECTIVE: Oxygen needs slowly improving, down to 6L this afterno mg, Oral, Daily  •  carvedilol (COREG) tab 6.25 mg, 6.25 mg, Oral, BID with meals  •  Clopidogrel Bisulfate (PLAVIX) tab 75 mg, 75 mg, Oral, Daily  •  Meropenem (MERREM) 500 mg in sodium chloride 0.9% 100 mL MBP, 500 mg, Intravenous, Q24H  •  Heparin Sodiu vasculitis   - wean O2 as able  - CTA negative for PE & LE dopplers negative  - COVID negative x 2   - PR3 is elevated, on empiric high dose steroids, solumedrol 125 q 8 hours  - rheum to see   - meropenem per ID   - volume removal with HD per renal.  2.  Sloan Greer

## 2020-03-30 NOTE — PHYSICAL THERAPY NOTE
PT order received, chart reviewed. Pt remains on droplet and contact precautions with 2 COVID-19 tests negative. Current recommendation is to not mobilize pts in hallway who remain on contact and droplet precautions.   D/w RN, pt is up in room, main c/o i

## 2020-03-30 NOTE — PROGRESS NOTES
[facility]  Progress Note     John Mo Patient Status:  Inpatient    9/15/1955 MRN [id number]   [facility] 4NW-A Attending Beckie Smith MD   Hosp Day # 7 PCP None Pcp     Chief Complaint: hypoxia    S: Patient needing CO2 27.0 25.0 25.0 22.0 27.0   ALKPHO 67 78 75  --   --    AST 14* 16 25  --   --    ALT <6* <6* 7*  --   --    BILT 0.4 0.4 0.3  --   --    TP 8.1 7.9 8.4*  --   --        Estimated Creatinine Clearance: 10.8 mL/min (A) (based on SCr of 6.73 mg/dL (H)). Prophylaxis: Heparin  · CODE status: Full     Will the patient be referred to TCC on discharge?: No  Estimated date of discharge: TBD  Discharge is dependent on: Clinical course  At this point Mr. Wendi Dunn is expected to be discharge to: TBD     Plan of

## 2020-03-30 NOTE — CONSULTS
[facility]  Rheumatology Report of Consultation  Bruna Farr Patient Status:  Inpatient    9/15/1955 MRN [id number]   [facility] 4NW-A Attending Iban Zavala MD   [facility] Day # 7 PCP None Pcp     Date of Admission: 3/23/2020  D improvement. His oxygen requirement has gone down from 10 L/min now to 6 L/min. He still feels short of breath. Still has a dry cough. Definitely gets dyspnea on exertion. No current chest pain. No current joint pain. No joint swelling. No fever. injection 10 mg, 10 mg, Intravenous, Q6H PRN  [COMPLETED] epoetin donita-epbx (RETACRIT) 11219 UNIT/ML injection 10,000 Units, 10,000 Units, Intravenous, Once in dialysis  benzonatate (TESSALON) cap 100 mg, 100 mg, Oral, TID PRN  guaiFENesin-codeine (HARRIS Once  Labetalol HCl (TRANDATE) injection 20 mg, 20 mg, Intravenous, Q4H PRN      Review of Systems: See history of present illness. Current positives are dry cough, shortness of breath, dyspnea on exertion, tired. Denies fever, denies chills.   No chest p somewhat nonspecific. Imaging features can be seen with viral pneumonia although other entities such as pulmonary edema may also be considered. There is more focal consolidation within the right middle lobe most compatible with pneumonia.   Interval devel been any improvement in these inflammatory markers. Possibly if there is further improvement of his condition a bronchoscopy could be done.   The bronchoscopy could rule out the possibility of an atypical infection being present and there might be a remote

## 2020-03-30 NOTE — PLAN OF CARE
Assumed care of patient at 0480 66 [phone] 75. Isolation maintained. Monitor on tele-NSR,  received on 10L HF. Pt tachypneic at times. PRN xanax provided for anxiety. Fall precautions in place.  Will continue to monitor

## 2020-03-30 NOTE — PROGRESS NOTES
INFECTIOUS DISEASE PROGRESS NOTE    Dallin Batres Patient Status:  Inpatient    9/15/1955 MRN [id number]   [facility] 3SW-A Attending Dorian Reardon MD   Hosp Day # 7 PCP None mg, Oral, Daily  •  Meropenem (MERREM) 500 mg in sodium chloride 0.9% 100 mL MBP, 500 mg, Intravenous, Q24H  •  Heparin Sodium (Porcine) 5000 UNIT/ML injection 5,000 Units, 5,000 Units, Subcutaneous, Q8H HERNANDO  •  acetaminophen (TYLENOL EXTRA STRENGTH) tab 1 118* 115* 188* 170*   BUN 47* 75* 47* 83* 70*   CREATSERUM 7.69* 10.20* 6.71* 9.58* 6.73*   GFRAA 8* 5* 9* 6* 9*   GFRNAA 7* 5* 8* 5* 8*   CA 8.8 8.8 8.8 9.2 9.7   ALB 2.1* 2.0* 2.0*  --  2.4*   * 132* 134* 133* 133*   K 4.8 4.4 4.3 4.9 5.2*   CL 99 mediastinal lymph nodes, the largest measuring 20 x 9 mm in the right paratracheal space and 15 x 9 mm in the pretracheal space. CARDIAC: Extensive coronary artery calcium. PLEURA: Small bilateral pleural effusions are markedly smaller.   CHEST WALL: No vasculitis is the cause of resp symptoms. Rheum has been consulted. PLAN:    · Since has completed 8 days of kayli will narrow back down to ancef to continue treatment for mssa BSI (EOT 4/8)  · Cont steroids for now as per pulm  · ?  Whether bronch would

## 2020-03-31 PROBLEM — I25.5 ISCHEMIC CARDIOMYOPATHY: Status: ACTIVE | Noted: 2020-01-01

## 2020-03-31 PROBLEM — I34.0 NONRHEUMATIC MITRAL VALVE REGURGITATION: Status: ACTIVE | Noted: 2020-01-01

## 2020-03-31 PROBLEM — Z95.1 HX OF CABG: Status: ACTIVE | Noted: 2020-01-01

## 2020-03-31 NOTE — ANESTHESIA PROCEDURE NOTES
Airway  Date/Time: 3/31/2020 3:44 PM  Urgency: elective    Airway not difficult    General Information and Staff    Patient location during procedure: ICU  Anesthesiologist: Jono Evans MD  Performed: anesthesiologist     Indications and Patient Con

## 2020-03-31 NOTE — PROGRESS NOTES
Started on dialysis around 1130,by 1150 dialysis called that he was desatting,o2 increased to 15 l /nc,still very hypoxic,respiratoy therapy was notified,placed patient on bipap,becomes very anxious trying to remove mask,xanax was given po x1, Riana Dobbs a

## 2020-03-31 NOTE — PROGRESS NOTES
[facility] Patient Status:  Inpatient    9/15/1955 MRN [id number]   [facility] 4NW-A Attending Rosa Young MD   Hosp Day # 8 PCP None Pcp     SUBJECTIVE:Pt with increased SOB and WOB today     OBJECTIVE:  BP vancomycin HCl (VANCOCIN) 125 MG cap 125 mg, 125 mg, Oral, Q6H  •  Albumin Human (ALBUMINAR) 25 % solution 100 mL, 100 mL, Intravenous, PRN  •  aspirin EC tab 81 mg, 81 mg, Oral, Daily  •  Calcium Carbonate Antacid (TUMS) chewable tab 500 mg, 500 mg, Oral, independently visualized all relevant chest imaging in PACS. I agree with the radiology interpretation except where noted. ASSESSMENT/PLAN:  1.  Acute hypoxemic respiratory failure: worsening hypoxia and WOB this afternoon  -will get stat ABG and CXR

## 2020-03-31 NOTE — PROGRESS NOTES
Unfortunately Mr Mcclain's condition has deteriorated and he is now in ICU and had to be intubated. He is now on a ventilator. I have rediscussed his case with Dr Heriberto Sunshine of Pulmonary and also discussed his case with his wife [facility].   So far no def

## 2020-03-31 NOTE — PLAN OF CARE
Problem: RESPIRATORY - ADULT  Goal: Achieves optimal ventilation and oxygenation  Description  INTERVENTIONS:  - Assess for changes in respiratory status  - Assess for changes in mentation and behavior  - Position to facilitate oxygenation and minimize r pain.

## 2020-03-31 NOTE — CONSULTS
[facility]  Cardiology Consultation    [facility] Patient Status:  Inpatient    9/15/1955 MRN [id number]   [facility] 6NE-A Attending Florin Thao MD   Hosp Day # 8 PCP None Pcp     Reason for Consultation: Atrial fibrillat days when he began getting Solu-Medrol. He was placed on propofol today though on the 27th and 28 he was on Precedex and seemed to have stable blood pressures on that. At 620 this morning he was given 10 mg of IV hydralazine.   Also of note in late Februa ANGIOPLASTY      poba   • COLONOSCOPY  2008   • COLONOSCOPY N/A 11/22/2017    Performed by Susana Mayes MD at [place] [address] [address]     • ESOPHAGOGASTRODUODENOSCOPY (EGD) N/A 4/30/2017    Performed by Susana Mayes MD at [facility] END injection 10 mg, 10 mg, Intravenous, Q6H PRN  •  benzonatate (TESSALON) cap 100 mg, 100 mg, Oral, TID PRN  •  guaiFENesin-codeine (ROBITUSSIN AC) 100-10 MG/5ML syrup 5 mL, 5 mL, Oral, Q4H PRN  •  Albuterol Sulfate  (90 Base) MCG/ACT inhaler 2 puff, kg)  08/29/19 : 195 lb (88.5 kg)      Physical Exam:   General: Intubated sedated. HEENT: Normocephalic, anicteric sclera. No jvd; carotids: no bruits; no tm; mouth moist.   Cardiac: Regular rate and rhythm. S1, S2 normal. no pericardial rub, S3.  No murmu pneumonia    Bilateral pulmonary infiltrates on CXR    Elevated sed rate    Shock ([address]    Atrial fibrillation with RVR ([address]): By chart review including [facility] cardiology notes through 2018 I see no previous mention of atrial fibrillation.     Hx of CABG/CA

## 2020-03-31 NOTE — PROGRESS NOTES
[facility]  Nephrology Progress Note    Sherry Savage Patient Status:  Inpatient    9/15/1955 MRN [id number]   [facility] 4SW-A Attending Griffin Chicas MD   Hosp Day # 8 PCP None Pcp       SUBJECTIVE:      Fatigued  HD today; PRN  ondansetron HCl (ZOFRAN) injection 4 mg, 4 mg, Intravenous, Q6H PRN  zolpidem (AMBIEN) tab 5 mg, 5 mg, Oral, Nightly PRN  HYDROcodone-acetaminophen (NORCO) 5-325 MG per tab 1 tablet, 1 tablet, Oral, Q6H PRN  calcium acetate (PHOSLO) cap 667 mg, 1 caps clinical picture entirely not c/w vasculitis (hx GPA) but noted to have NH-3 ab level at 77 and CT does demonstrate diffuse ground glass opacities as well as focal pneumonia RML.   COVD-19 neg  - remains on IV abx  - IV steroids    - appreciate pulm/rheum e

## 2020-03-31 NOTE — PHYSICAL THERAPY NOTE
Order received for PT eval and chart reviewed. RN reports patient with dyspnea at rest this am and to have HD today. RN recommends to hold therapy at this time. Will continue to follow.

## 2020-03-31 NOTE — PLAN OF CARE
Revisited pt again this pm and he looks more tachypnic and increased WOB. Discussed with Dr Kadeem Pérez by bedside. Will transfer to ICU  Place on Precedex  Cont steroid and plan for Rituxin    I have contacted wife and updated her over the phone as well.

## 2020-03-31 NOTE — PROGRESS NOTES
[facility]  Progress Note     Maine Aguirre Patient Status:  Inpatient    9/15/1955 MRN [id number]   [facility] 4NW-A Attending Agus Llanes MD   Hosp Day # 8 PCP None Pcp     Chief Complaint: hypoxia     S: Patient getting ALKPHO 67 78 75  --   --    AST 14* 16 25  --   --    ALT <6* <6* 7*  --   --    BILT 0.4 0.4 0.3  --   --    TP 8.1 7.9 8.4*  --   --        Estimated Creatinine Clearance: 10.8 mL/min (A) (based on SCr of 6.73 mg/dL (H)).     Recent Labs   Lab 03/28/20 threshold to tx to ICU ]      Addendum:   informed pt is agitated, taking BIPAP off. Will order ABG and inform pulm, possible transfer to ICU.      Quality:  · DVT Prophylaxis: Heparin- refusing   · CODE status: Full     Will the patient be referred to [facility]

## 2020-03-31 NOTE — PLAN OF CARE
Patient is a/o x4. SOB with exertion noted. Breathing easy with oxygen saturation above 90% on 7 LHF. Denies pain. No BM overnight. Slept.    Problem: GASTROINTESTINAL - ADULT  Goal: Maintains adequate nutritional intake (undernourished)  Description  INTER

## 2020-03-31 NOTE — PLAN OF CARE
Pt alert and oriented x4, O2 sats 92% on 7L oxygen via high flow nasal cannula. Pt up with standby assist to commode. SOB and tachypneic with ambulation; O2 sats dropped to 79% when ambulating to commode, pt requiring 10L at the time.  Once back at rest, st limits  Description  INTERVENTIONS:  - Monitor labs and rhythm and assess patient for signs and symptoms of electrolyte imbalances  - Administer electrolyte replacement as ordered  - Monitor response to electrolyte replacements, including rhythm and repeat

## 2020-04-01 NOTE — RESPIRATORY THERAPY NOTE
Arterial Catherization Procedure:    Artline line placed in left radial using a sterile technique with a 20 gauge arrow catheter. Threaded without resistance with good blow flow and return.

## 2020-04-01 NOTE — PLAN OF CARE
Assumed care of pt at 1. Pt resting in bed, on ventilator, sedation medications (see MAR), and levophed gtt. In-line and oral suction done PRN.  OG tube in place, connected to low-intermittent suction, draining dark, coffee-ground emesis (MD's aware, per

## 2020-04-01 NOTE — PROGRESS NOTES
Patient received on [facility] VC+ 16/550/60%+8. Patient breathing above ventilator set rate. Diminished breath sounds bilaterally. Routine care given. Suctioned and produced a scant amount of secretions via ETT. Will continue to monitor closely.  Saturation remain

## 2020-04-01 NOTE — PROGRESS NOTES
[facility]  Rheumatology Progress Note  Varghesebonnie Myers Patient Status:  Inpatient    9/15/1955 MRN [id number]   [facility] 6NE-A Attending Angeli Juarez MD   [facility] Day # 9 PCP None Pcp     Subjective:  Varghese Myers is a( < > 134* 129*  --    K  --    < > 4.9 4.6  --    CL  --    < > 96* 91*  --    CO2  --    < > 24.0 24.0  --    GLU  --    < > 194* 256*  --    CA  --    < > 7.8* 8.5  --    ALB  --   --  2.4*  --   --    ALKPHO  --   --  145*  --   --    BILT  --   --  0.7

## 2020-04-01 NOTE — CM/SW NOTE
Care Progression Note:  Active Acute Medical Issue:   Acute hypoxic respiratory failure  Adenovirus  Pneumonia  Cdif     Patient vented/sedated. On propofol and protonix gtts. Bronch done today. Await culture results.   UGI today shows stomach erosions and

## 2020-04-01 NOTE — PLAN OF CARE
Patient still  intubated on Propofol and levo drip, trying to wean off both drips. Patient is Positive for CIDIF and occult blood is positive. Patient is on both Droplet/ special contact precaution.  ordered GI consult.

## 2020-04-01 NOTE — OPERATIVE REPORT
PATIENT NAME: Korey Roberts  MRN: [id number]  DATE OF OPERATION: 4/1/2020  REFERRING PHYSICIAN: Dr. Scott Awad  Medications:  Propafol drip per ICU protocol  PREOPERATIVE DIAGNOSIS    Melena   Anemia   Coffee ground emesis  POSTOPERATIVE DIAGNOSIS:  1. Nor completed. The patient tolerated the procedure well. RECOMMENDATIONS   1. Ok to replace OG tube and start tube feedings. 2. Will bolus with pantoprazole 80 mg IV now and then start an 8 mg IV per hour infusion.   3. Sucralafate 1 gm per OG tube every 8

## 2020-04-01 NOTE — PROGRESS NOTES
INFECTIOUS DISEASE PROGRESS NOTE    Nidia Phipps Patient Status:  Inpatient    9/15/1955 MRN [id number]   [facility] 3SW-A Attending Kia Doran MD   Hosp Day # 9 PCP None Weight), Intravenous, Continuous  •  norepinephrine (LEVOPHED) 4 mg/250 ml premix infusion, 0.5-30 mcg/min, Intravenous, PRN  •  Albumin Human (ALBUMINAR) 25 % solution 25 g, 25 g, Intravenous, PRN  •  dilTIAZem HCl (CARDIZEM) injection 10 mg, 10 mg, Intra Intravenous, Q4H PRN      Physical Exam:  General: No acute distress. Intubated and sedated.    04/01/20  1200 04/01/20  1230 04/01/20  1300 04/01/20  1330   BP: 105/53  100/83    BP Location:       Pulse: 66 66 66 68   Resp: (!) 27 (!) 31 20 (!) 29   Temp: Result Value Ref Range    Blood Culture Result No Growth 5 Days N/A       Radiology:       Reviewed. PROCEDURE: XR CHEST AP/PA (1 VIEW) (CPT=71045)    TECHNIQUE: AP chest radiograph was obtained.     COMPARISON: [facility] , XR, XR CHEST AP PORTABLE (CPT= chlamydia are included in VRP and neg. Not able to make urine for legionella testing. Did not improve after 8 days of meropenem  - initially improved with steroids-->now back in the ICU and now intubated  - covid 19 neg x 2  - not able to make sputum.   - h

## 2020-04-01 NOTE — BRIEF OP NOTE
Pre-Operative Diagnosis: melena     Post-Operative Diagnosis: pyloric ulcer, ng tube trauma and duodenitis     Procedure Performed:   Procedure(s):  ESOPHAGOGASTRODUODENOSCOPY    Surgeon(s) and Role:     * Charlie Dumont MD - Primary    Assistant(s):

## 2020-04-01 NOTE — RESPIRATORY THERAPY NOTE
Patient brought from floor on bipap   Needing to be intubated. Anesthesia here. ETT 8.0 25 AT LIP.  XRAY PENDING. PLACED PATIENT ON  RR 16 70% PEEP +5  INCREASED PEEP TO 8 AFTER INITIAL ABG.

## 2020-04-01 NOTE — OPERATIVE REPORT
Bronchoscopy procedure report    Preop diagnosis: Acute hypoxic resp failure, abnl CT  Postop diagnosis:  same  Procedure performed: Bronchoscopy, Diagnostic  Bronchoalveolar lavage, BAL    Sedation used:  Propofol  Moderate Sedation Time: 15min    Descrip

## 2020-04-01 NOTE — CONSULTS
GASTROENTEROLOGY CONSULTATION  Jerod Barreto MD    Department of Gastroenterology  [facility] Patient Status:  Inpatient    9/15/1955 MRN [id number]   [facility] 6NE-A Attending Anabela Kumar MD   [facility] Day poba   • COLONOSCOPY  2008   • COLONOSCOPY N/A 11/22/2017    Performed by Riri Jonas MD at [place] [address] [address]     • ESOPHAGOGASTRODUODENOSCOPY (EGD) N/A 4/30/2017    Performed by Riri Jonas MD at [place] [address]. •  0.9% NaCl infusion, , Intravenous, Once  •  propofol (DIPRIVAN) infusion, 5-100 mcg/kg/min (Dosing Weight), Intravenous, Continuous  •  norepinephrine (LEVOPHED) 4 mg/250 ml premix infusion, 0.5-30 mcg/min, Intravenous, PRN  •  Albumin Human (ALBUMINAR) •  calcium acetate (PHOSLO) cap 667 mg, 1 capsule, Oral, TID CC  •  Labetalol HCl (TRANDATE) injection 20 mg, 20 mg, Intravenous, Q4H PRN    Review of Systems: - obtained from chart as pt is non-communicative  Gastrointestinal: See above  General: Maria Isabel f WBC 14.0 04/01/2020    HGB 7.4 04/01/2020    HCT 21.9 04/01/2020    .0 04/01/2020    CREATSERUM 6.82 04/01/2020    BUN 98 04/01/2020     04/01/2020    K 4.6 04/01/2020    CL 91 04/01/2020    CO2 24.0 04/01/2020     04/01/2020    CA 8.5    Endoscopist:                   Herminio Anne MD        Procedure:   After the patient was interviewed and the procedure again discussed and questions addressed, the patient was brought to the GI Lab and monitoring of the B/P, pulse, and pulse oximet EBL: minimal        Complications: none        Condition on discharge from procedure:  good        PLAN:  Patient is to follow a high fiber, low fat diet and followup with primary physician for routine care.     He has a history of PUD.  Preoperatively we d

## 2020-04-01 NOTE — PROGRESS NOTES
INFECTIOUS DISEASE PROGRESS NOTE    Sarah Urbina Patient Status:  Inpatient    9/15/1955 MRN [id number]   [facility] 3SW-A Attending Ole Garrido MD   Hosp Day # 8 PCP None HYDROmorphone HCl (DILAUDID) 1 MG/ML injection 1 mg, 1 mg, Intravenous, Q4H PRN  •  vancomycin HCl (VANCOCIN) 125 MG cap 125 mg, 125 mg, Oral, Q6H  •  aspirin EC tab 81 mg, 81 mg, Oral, Daily  •  Calcium Carbonate Antacid (TUMS) chewable tab 500 mg, 500 mg 15.4* 15.4* 15.8* 15.6*   NEPRELIM 7.91*  --  13.00* 13.88*   WBC 8.4 18.2* 13.4* 14.2*   .0 284.0 159.0 179.0     Recent Labs   Lab 03/26/20  0434 03/27/20  0350  03/29/20  0510 03/31/20  1018 03/31/20  1734   * 115*   < > 170* 211* 194*   B further details. Dictated by: Earnestine Barrett MD on 3/31/2020 at 2:09 PM   Finalized by: Earnestine Barrett MD on 3/31/2020 at 2:11 PM     ASSESSMENT:    1. Pneumonia with acute hypoxic resp failure- ? Infectious vs inflammatory.  Infiltrates in CT seem more diffu

## 2020-04-01 NOTE — PROGRESS NOTES
[facility]  Progress Note     Varghese Louis Patient Status:  Inpatient    9/15/1955 MRN [id number]   [facility] 6NE-A Attending Angeli Juarez MD   Hosp Day # 9 PCP None Pcp     Chief Complaint: SOB  S:  Intubated, sedated --   --  0.7  --    TP 7.9 8.4*  --   --   --  7.5  --     < > = values in this interval not displayed. Estimated Creatinine Clearance: 10.7 mL/min (A) (based on SCr of 6.82 mg/dL (H)).   Recent Labs   Lab 03/28/20  0542   PTP 15.8*   INR 1.22*     No cardiomyopathy, EF 35-40%  1. ASA, BB  2. Cardio following   10. Mitral regurgitation, mod-severe   11. Tricuspid regurgitation, mild-mod  12. Essential hypertension- BB   13. ESRD on HD, T-Th-Sat, right AVF  1. HD per nephrology  14.  Recent MSSA bacteremi

## 2020-04-01 NOTE — PROGRESS NOTES
[facility]  Progress Note    Renetta Keene     Subjective: Intubated sedated. Blood pressure improved last night after transfusion. Patient had coffee-ground emesis. He also had melena today per nurse. Going for bronchoscopy shortly. citrate (SUBLIMAZE) 0.05 MG/ML injection 25 mcg, 25 mcg, Intravenous, Q30 Min PRN    Or  fentaNYL citrate (SUBLIMAZE) 0.05 MG/ML injection 50 mcg, 50 mcg, Intravenous, Q30 Min PRN  acetaminophen (TYLENOL) tab 650 mg, 650 mg, Oral, Q6H PRN    Or  acetaminop mg, Oral, TID PRN  guaiFENesin-codeine (ROBITUSSIN AC) 100-10 MG/5ML syrup 5 mL, 5 mL, Oral, Q4H PRN  Albuterol Sulfate  (90 Base) MCG/ACT inhaler 2 puff, 2 puff, Inhalation, Q4H PRN  HYDROmorphone HCl (DILAUDID) 1 MG/ML injection 0.5 mg, 0.5 mg, In current report on echo though I cannot hear it on physical exam.    Ischemic cardiomyopathy: EF 35-40% in the setting of significant mitral regurgitation. Severe anemia: He has a history of peptic ulcer disease a few years back by chart review.       Neelima

## 2020-04-01 NOTE — PROGRESS NOTES
[facility]  Nephrology Progress Note    Sarah Urbina Patient Status:  Inpatient    9/15/1955 MRN [id number]   [facility] 4SW-A Attending Ole Garrido MD   Hosp Day # 9 PCP None Pcp       SUBJECTIVE:    Yesterday events revie meals  Clopidogrel Bisulfate (PLAVIX) tab 75 mg, 75 mg, Oral, Daily  Heparin Sodium (Porcine) 5000 UNIT/ML injection 5,000 Units, 5,000 Units, Subcutaneous, Q8H HERNANDO  acetaminophen (TYLENOL EXTRA STRENGTH) tab 1,000 mg, 1,000 mg, Oral, Q6H PRN  ondansetron 8.6 7.8* 8.5   MG  --  2.5  --        Recent Labs     03/31/20  1734   ALT 31   *   ALB 2.4*         Results for Kaylin Carvajal (MRN [id number]) as of 3/30/2020 09:01   Ref.  Range 3/23/2020 09:22   SERINE PROTEASE3, IGG Latest Ref Range: 0 - 1

## 2020-04-02 NOTE — PROGRESS NOTES
[facility]  Progress Note    Danielle Gary Patient Status:  Inpatient    9/15/1955 MRN [id number]   [facility] 6NE-A Attending Mali Fcuhs MD   Hosp Day # 10 PCP None Pcp     Subjective:  Somnolent on ventilator      Current tablet, Oral, Q15 Min PRN **OR** dextrose 50 % injection 50 mL, 50 mL, Intravenous, Q15 Min PRN **OR** glucose (DEX4) oral liquid 30 g, 30 g, Oral, Q15 Min PRN **OR** Glucose-Vitamin C (DEX-4) chewable tab 8 tablet, 8 tablet, Oral, Q15 Min PRN  •  Insulin Oral, Q6H PRN  •  ondansetron HCl (ZOFRAN) injection 4 mg, 4 mg, Intravenous, Q6H PRN  •  zolpidem (AMBIEN) tab 5 mg, 5 mg, Oral, Nightly PRN  •  HYDROcodone-acetaminophen (NORCO) 5-325 MG per tab 1 tablet, 1 tablet, Oral, Q6H PRN  •  calcium acetate (PHOS %.      EXAM:  /51   Pulse 62   Temp 97.5 °F (36.4 °C)   Resp 18   Ht 5' 9\" (1.753 m)   Wt 175 lb 11.3 oz (79.7 kg)   SpO2 93%   BMI 25.95 kg/m²   GENERAL: well developed, well nourished,  HEENT: atraumatic, normocephalic  NECK: supple, no adenopath

## 2020-04-02 NOTE — PROGRESS NOTES
[facility]    S: Pt underwent bronch and EGD yesterday. He's being given a PRBC transfusion this am for anemia. BP dropped overnight, he's on levophed for BP support. Currently intubated and sedated.      Meds:  • aspirin  81 mg Oral Daily SpO2: 94% 95% 95% 94%   Weight:       Height:         Vent Mode: VC+  FiO2 (%):  [40 %-50 %] 50 %  S RR:  [16] 16  S VT:  [550 mL] 550 mL  PEEP/CPAP (cm H2O):  [5 cm H20] 5 cm H20  MAP (cm H2O):  [7.2-17] 9       Gen - intubated, sedated.    Lungs - CTAB rituxan  -antibiotics per ID   - volume removal with HD per renal.  2. Shock - likely due to anemia, sepsis, effects of sedatives  -wean levophed as able  3.  ID - possible pneumonia, Cdiff+, recent MSSA bacteremia  -antibiotics per ID: ancef  -vanco po for

## 2020-04-02 NOTE — PROGRESS NOTES
[facility]  Progress Note     Yennifer Patton Patient Status:  Inpatient    9/15/1955 MRN [id number]   [facility] 6NE-A Attending Gopi Lerner MD   Hosp Day # 10 PCP None Pcp     Chief Complaint: SOB  S:  Intubated, sedate 96* 91* 93*   CO2 25.0   < > 27.0   < > 24.0 24.0 21.0   ALKPHO 75  --   --   --  145*  --   --    AST 25  --   --   --  113*  --   --    ALT 7*  --   --   --  31  --   --    BILT 0.3  --   --   --  0.7  --   --    TP 8.4*  --   --   --  7.5  --   --     < consult  5. C.diff- cont PO vancomycin and isolation   6. Anemia, chronic disease- +fecal occult/ GI bleed  1. GI consulted   2. S/p transfusion 4/2   7.  UC   8. CAD sp CABG- stop  ASA, plavix   Cont , BB  9. Afib RVR- Cardizem and cardio following   10.

## 2020-04-02 NOTE — PROGRESS NOTES
[facility]  Progress Note    Marino Ramirez     Subjective: Intubated and sedated.       Intake/Output:    Intake/Output Summary (Last 24 hours) at 4/2/2020 1049  Last data filed at 4/2/2020 0609  Gross per 24 hour   Intake 1515.45 ml   Output 3 citrate (SUBLIMAZE) 0.05 MG/ML injection 25 mcg, 25 mcg, Intravenous, Q30 Min PRN    Or  fentaNYL citrate (SUBLIMAZE) 0.05 MG/ML injection 50 mcg, 50 mcg, Intravenous, Q30 Min PRN  acetaminophen (TYLENOL) tab 650 mg, 650 mg, Oral, Q6H PRN    Or  acetaminop mg, Intravenous, Q1H PRN  ceFAZolin sodium (ANCEF/KEFZOL) 2 GM/20ML premix IV syringe 2 g, 2 g, Intravenous, Q24H  ALPRAZolam (XANAX) tab 0.25 mg, 0.25 mg, Oral, TID PRN  methylPREDNISolone Sodium Succ (Solu-MEDROL) injection 125 mg, 125 mg, Intravenous, Q ([address]    Adenovirus pneumonia    Bilateral pulmonary infiltrates on CXR    Elevated sed rate    Shock ([address]    Atrial fibrillation with RVR ([address]): This is a new finding for the patient that he experienced on the evening of 3-31.   This resolved with a simpl amiodarone. He is not a current candidate for systemic anticoagulation given his acute GI bleeding. Plavix is still wearing off as his last dose was March 31. Aspirin will be held for a few days and then he can be resumed at a low dose.   Alternatively i

## 2020-04-02 NOTE — PLAN OF CARE
Assumed care of pt at [facility]. Pt resting in bed, VSS. Sedated (see MAR), tolerating ventilator, no s/s of pain/discomfort noted. Pt following commands by squeezing with left hand when sedation weaned down. NG tube in place. SBP >90 maintained overnight.  Turn

## 2020-04-02 NOTE — PROGRESS NOTES
INFECTIOUS DISEASE PROGRESS NOTE    Sarah Urbina Patient Status:  Inpatient    9/15/1955 MRN [id number]   [facility] 3SW-A Attending Ole Garrido MD   Hosp Day # 10 PCP None Pantoprazole Sodium (PROTONIX) 80 mg in sodium chloride 0.9% 100 mL infusion, 8 mg/hr, Intravenous, Continuous  •  EPINEPHrine (ADRENALIN) 1 MG/10ML injection (CARDIAC ARREST), , , PRN  •  glucose (DEX4) oral liquid 15 g, 15 g, Oral, Q15 Min PRN **OR** Gl vancomycin HCl (VANCOCIN) 125 MG cap 125 mg, 125 mg, Oral, Q6H  •  Calcium Carbonate Antacid (TUMS) chewable tab 500 mg, 500 mg, Oral, Daily  •  acetaminophen (TYLENOL EXTRA STRENGTH) tab 1,000 mg, 1,000 mg, Oral, Q6H PRN  •  ondansetron HCl (ZOFRAN) inje CREATSERUM 6.71*   < > 6.73*   < > 5.91* 6.82* 8.19*   GFRAA 9*   < > 9*   < > 11* 9* 7*   GFRNAA 8*   < > 8*   < > 9* 8* 6*   CA 8.8   < > 9.7   < > 7.8* 8.5 7.9*   ALB 2.0*  --  2.4*  --  2.4*  --   --    *   < > 133*   < > 134* 129* 132*   K 4.3 and coffee ground emesis via NG tube  - s/p EGD 4/1 with pyloric ulcer, severe duodentitis with multiple erosions and NG tube trauma to proximal stomach with multiple erosions  - s/p PRBCs 4/2    PLAN:  · Continue ancef (narrowed down to ancef to continue

## 2020-04-02 NOTE — PLAN OF CARE
Patient still intubated, on H/H Q12hrs. Patient on fentanyl drip, Precedex and Protonix. Propofol was DC after 30 minutes of starting fentanyl.  Extubation trial failed because patient was breathing heavily using accessory muscles and saturating in low 90's

## 2020-04-02 NOTE — PROGRESS NOTES
[facility]  Nephrology Progress Note    Montaño Gen Patient Status:  Inpatient    9/15/1955 MRN [id number]   [facility] 4SW-A Attending Justus Ware MD   Hosp Day # 10 PCP None Pcp       SUBJECTIVE:    Pt intubated/sedated Or  dextrose 50 % injection 50 mL, 50 mL, Intravenous, Q15 Min PRN    Or  glucose (DEX4) oral liquid 30 g, 30 g, Oral, Q15 Min PRN    Or  Glucose-Vitamin C (DEX-4) chewable tab 8 tablet, 8 tablet, Oral, Q15 Min PRN  Insulin Aspart Pen (NOVOLOG) 100 UNIT/ML HCl (ZOFRAN) injection 4 mg, 4 mg, Intravenous, Q6H PRN  zolpidem (AMBIEN) tab 5 mg, 5 mg, Oral, Nightly PRN  HYDROcodone-acetaminophen (NORCO) 5-325 MG per tab 1 tablet, 1 tablet, Oral, Q6H PRN  calcium acetate (PHOSLO) cap 667 mg, 1 capsule, Oral, TID CC Impression/Plan:       1.   SOB/cough- presentation c/w pneumonia and noted to be adenovirus +.  clinical picture entirely not c/w vasculitis (hx GPA) but noted to have NM-3 ab level at 77 and CT does demonstrate diffuse ground glass opacities as well

## 2020-04-02 NOTE — DIETARY NOTE
NUTRITION INITIAL ASSESSMENT    Pt is at moderate nutrition risk. Pt does not meet malnutrition criteria.     NUTRITION DIAGNOSIS/PROBLEM:    Inadequate oral intake related to inability to consume adequate energy as evidenced by NPO, intubation/sedation RELATED PHYSICAL FINDINGS:     1. Body Fat/Muscle Mass: Pt in isolation; no physical assessment done at this time     2.  Fluid Accumulation: + general edema noted    NUTRITION PRESCRIPTION:  Calories: 2328-4822 calories/day (22-27 calories per kg)  Protein

## 2020-04-03 NOTE — PROGRESS NOTES
[facility]  Rheumatology Progress Note  Triston Locke Patient Status:  Inpatient    9/15/1955 MRN [id number]   [facility] 6NE-A Attending Jason Mayberry MD   [facility] Day # 11 PCP None Pcp     Subjective:  Triston Locke is a GPA/vasculitis. They are in agreement with its use. Mrs Castellanos Gear aware of use of IVIG and also agrees with IVIG use.   Unfortunately it can't be guareented that the IVIG will help and Mrs Jesus Alberto Whiting is aware of the critical nature of her 's ca

## 2020-04-03 NOTE — DIETARY NOTE
Clinical Nutrition     Recommend Nepro at 15 ml/hour advancing 10 ml/hour q 6 hours to goal rate of 45 ml/hour. Recommend 120 ml water flush q 4 hours(or per Nephrology). This will provide 1944kcals, 88g protein, 1508ml free h20, 100% RDIs.      Please

## 2020-04-03 NOTE — PROGRESS NOTES
[address]  Patient Status:  Inpatient    9/15/1955 MRN [id number]   [facility] 6NE-A Attending Mike Jordan MD   [facility] Day # 6 PCP None Pcp     Critical Care Progress Note     Date of Admission: 3/23/2020  8: Intravenous, Continuous  •  EPINEPHrine (ADRENALIN) 1 MG/10ML injection (CARDIAC ARREST), , , PRN  •  glucose (DEX4) oral liquid 15 g, 15 g, Oral, Q15 Min PRN **OR** Glucose-Vitamin C (DEX-4) chewable tab 4 tablet, 4 tablet, Oral, Q15 Min PRN **OR** dextro Antacid (TUMS) chewable tab 500 mg, 500 mg, Oral, Daily  •  acetaminophen (TYLENOL EXTRA STRENGTH) tab 1,000 mg, 1,000 mg, Oral, Q6H PRN  •  ondansetron HCl (ZOFRAN) injection 4 mg, 4 mg, Intravenous, Q6H PRN  •  zolpidem (AMBIEN) tab 5 mg, 5 mg, Oral, Nig Value Date    PT 14.4 08/07/2013    INR 1.22 (H) 03/28/2020    INR 0.93 08/01/2017    INR 0.99 04/29/2017           Imaging: CXR today pending     Assessment and Plan     1.  Acute hypoxemic respiratory failure- intubated 3/31, possibly due to vasculitis wi

## 2020-04-03 NOTE — PROGRESS NOTES
[facility]  Nephrology Progress Note    Dallin Batres Patient Status:  Inpatient    9/15/1955 MRN [id number]   [facility] 4SW-A Attending Dorian Reardon MD   Hosp Day # 11 PCP None Pcp       SUBJECTIVE:    Intubated/sedated  He Q15 Min PRN    Or  dextrose 50 % injection 50 mL, 50 mL, Intravenous, Q15 Min PRN    Or  glucose (DEX4) oral liquid 30 g, 30 g, Oral, Q15 Min PRN    Or  Glucose-Vitamin C (DEX-4) chewable tab 8 tablet, 8 tablet, Oral, Q15 Min PRN  Insulin Aspart Pen (NOVOL (NORCO) 5-325 MG per tab 1 tablet, 1 tablet, Oral, Q6H PRN  calcium acetate (PHOSLO) cap 667 mg, 1 capsule, Oral, TID CC  Labetalol HCl (TRANDATE) injection 20 mg, 20 mg, Intravenous, Q4H PRN            Physical Exam:   BP 92/40   Pulse 57   Temp 96.9 °F ( SOB/cough- presentation c/w pneumonia and noted to be adenovirus +  clinical picture entirely not c/w vasculitis (hx GPA) but noted to have AK-3 ab level at 77 and CT does demonstrate diffuse ground glass opacities as well as focal pneumonia RML.   [address]

## 2020-04-03 NOTE — PROGRESS NOTES
[facility]  Progress Note    Crystal Chauhan     Subjective: Intubated and sedated. Does not open eyes to my voice.       Intake/Output:    Intake/Output Summary (Last 24 hours) at 4/3/2020 1121  Last data filed at 4/3/2020 0900  Gross per 24 ho (LEVOPHED) 4 mg/250 ml premix infusion, 0.5-30 mcg/min, Intravenous, Continuous  insulin detemir (LEVEMIR) 100 UNIT/ML flextouch 12 Units, 12 Units, Subcutaneous, Nightly  metoprolol tartrate (LOPRESSOR) partial tablet 12.5 mg, 12.5 mg, Oral, 2x Daily(Beta (DIPRIVAN) infusion, 5-100 mcg/kg/min (Dosing Weight), Intravenous, Continuous  Albumin Human (ALBUMINAR) 25 % solution 25 g, 25 g, Intravenous, PRN  dilTIAZem HCl (CARDIZEM) injection 10 mg, 10 mg, Intravenous, Q1H PRN  ceFAZolin sodium (ANCEF/KEFZOL) 2 G pneumonia    Chronic systolic CHF (congestive heart failure) (HCC)    C. difficile colitis    Sepsis ([address]    Adenovirus pneumonia    Bilateral pulmonary infiltrates on CXR    Elevated sed rate    Shock ([address]    Atrial fibrillation with RVR ([address]): New onse ill patient. He is immunosuppressed. He is adenovirus positive. His volume status looks good. He is no longer in shock. He tolerated dialysis yesterday. I would keep him off antiplatelet therapy until melena resolves.   Then consider either a baby asp

## 2020-04-03 NOTE — RESPIRATORY THERAPY NOTE
Received pt on VC+ 16/550/50%/+5. Switch to Lucrecia with same setting. Pt tolerated well. Will monitor pt closely.

## 2020-04-03 NOTE — PROGRESS NOTES
[facility]  Progress Note    [place] Patient Status:  Inpatient    9/15/1955 MRN [id number]   [facility] 6NE-A Attending Anabela Kumar MD   Hosp Day # 11 PCP None Pcp     Subjective:  Hemodynamically stable - on the vent Min PRN **OR** Glucose-Vitamin C (DEX-4) chewable tab 4 tablet, 4 tablet, Oral, Q15 Min PRN **OR** dextrose 50 % injection 50 mL, 50 mL, Intravenous, Q15 Min PRN **OR** glucose (DEX4) oral liquid 30 g, 30 g, Oral, Q15 Min PRN **OR** Glucose-Vitamin C (DEX- HCl (ZOFRAN) injection 4 mg, 4 mg, Intravenous, Q6H PRN  •  zolpidem (AMBIEN) tab 5 mg, 5 mg, Oral, Nightly PRN  •  HYDROcodone-acetaminophen (NORCO) 5-325 MG per tab 1 tablet, 1 tablet, Oral, Q6H PRN  •  calcium acetate (PHOSLO) cap 667 mg, 1 capsule, Ora %.      EXAM:  /54   Pulse 56   Temp 97.1 °F (36.2 °C) (Temporal)   Resp 16   Ht 5' 9\" (1.753 m)   Wt 170 lb 13.7 oz (77.5 kg)   SpO2 92%   BMI 25.23 kg/m²   GENERAL: sedated on vent  HEENT: atraumatic, normocephalic  NECK: supple, no adenopathy, no

## 2020-04-03 NOTE — PLAN OF CARE
Assumed care of pt at [address] [place] [address]. Pt resting in bed, VSS. Ventilated and sedated, pt tolerating ventilator. Levo on for SBP >90 as needed. Precedex, propofol, fentanyl and protonix infusing. Not showing any s/s of discomfort.  Pt continuing to have loose black st

## 2020-04-03 NOTE — PROGRESS NOTES
INFECTIOUS DISEASE PROGRESS NOTE    Varghesebonnie Myers Patient Status:  Inpatient    9/15/1955 MRN [id number]   [facility] 3SW-A Attending Angeli Juarez MD   Hosp Day # 11 PCP None g, Per NG Tube, TID AC and HS  •  Pantoprazole Sodium (PROTONIX) 80 mg in sodium chloride 0.9% 100 mL infusion, 8 mg/hr, Intravenous, Continuous  •  EPINEPHrine (ADRENALIN) 1 MG/10ML injection (CARDIAC ARREST), , , PRN  •  glucose (DEX4) oral liquid 15 g, mg, 1 mg, Intravenous, Q4H PRN  •  Calcium Carbonate Antacid (TUMS) chewable tab 500 mg, 500 mg, Oral, Daily  •  acetaminophen (TYLENOL EXTRA STRENGTH) tab 1,000 mg, 1,000 mg, Oral, Q6H PRN  •  ondansetron HCl (ZOFRAN) injection 4 mg, 4 mg, Intravenous, Q6 70*   < > 75* 98* 130* 76*   CREATSERUM 6.73*   < > 5.91* 6.82* 8.19* 5.21*   GFRAA 9*   < > 11* 9* 7* 12*   GFRNAA 8*   < > 9* 8* 6* 11*   CA 9.7   < > 7.8* 8.5 7.9* 7.4*   ALB 2.4*  --  2.4*  --   --   --    *   < > 134* 129* 132* 135*   K 5.2*   < Pneumonia with acute hypoxic resp failure- ? Infectious vs inflammatory. Infiltrates in CT seem more diffuse, not c/w bacterial pna. VRP is + for adenovirus, but presentation unusual for viral pna.  Patient improved with steroids, raising concerns for under Impression/ Recs as noted above. Labs noted. All cx from BAL so far neg. Histo serology neg. Pneumocystis pend.  Cytology with some atypical cells, although presentation seems unusual for malignancy  D/w RN  Will follow     Cecilio Salinas MD

## 2020-04-03 NOTE — CM/SW NOTE
Care Progression Note:  Active Acute Medical Issue:   Acute hypoxic respiratory failure  Adenovirus  Pneumonia  Cdif     Patient vented/sedated. On precedex, propofol, fentanyl, levophed, and protonix gtts.   Per rheum, Rituxan will be initiated d/t vascul

## 2020-04-03 NOTE — PROGRESS NOTES
[facility]  Progress Note     Renetta Keene Patient Status:  Inpatient    9/15/1955 MRN [id number]   [facility] 6NE-A Attending Isha Murguia MD   Hosp Day # 11 PCP None Pcp     Chief Complaint: SOB  S:  Intubated, sedate --    *   < > 134* 129* 132* 135*   K 5.2*   < > 4.9 4.6 5.0 5.0   CL 97*   < > 96* 91* 93* 100   CO2 27.0   < > 24.0 24.0 21.0 25.0   ALKPHO  --   --  145*  --   --   --    AST  --   --  113*  --   --   --    ALT  --   --  31  --   --   --    BILT today if cleared by GI    4. Leukocytosis- stable on steroids   1. On steroid   2. IV abx - Meropenem  3. CC & ID on consult  5. C.diff- cont PO vancomycin and isolation   6. Anemia, chronic disease- +fecal occult/ GI bleed  1. GI consulted   2.  S/p transf look like significant effusion  Appreciate consults       Dacia Aranda MD

## 2020-04-03 NOTE — PLAN OF CARE
Assumed care of pt at about 0730  Pt is vented and on propofol when propofol is decrease patient able to move extremities. Levo was stopped (see MAR) and BP has been stable. Precedex was stopped due to bradycardia. Pt will start on tube feeds.  HD scheduled

## 2020-04-04 NOTE — PLAN OF CARE
Assumed care of patient at 0730. Spoke with Dr. Demetrio Eldridge regarding morning ABG post vent setting changes,. Orders given to increase RR to 24. And apply co2 end tital to vent. RT aware and completed.    Pt currently breathing over vent, staking breathes, and respiratory difficulty  - Respiratory Therapy support as indicated  - Manage/alleviate anxiety  - Monitor for signs/symptoms of CO2 retention  Outcome: Progressing     Problem: GASTROINTESTINAL - ADULT  Goal: Maintains adequate nutritional intake (undernou ordered  - Assess for signs and symptoms of hyperglycemia and hypoglycemia  - Administer ordered medications to maintain glucose within target range  - Assess barriers to adequate nutritional intake and initiate nutrition consult as needed  - Instruct vamsi

## 2020-04-04 NOTE — PROGRESS NOTES
[facility]  Progress Note    Sherry Savage Patient Status:  Inpatient    9/15/1955 MRN [id number]   [facility] 6NE-A Attending Griffin Chicas MD   Hosp Day # 12 PCP None Pcp     Subjective:  No further melena. Hgb stable. Mildred Jimenez Continuous  •  EPINEPHrine (ADRENALIN) 1 MG/10ML injection (CARDIAC ARREST), , , PRN  •  glucose (DEX4) oral liquid 15 g, 15 g, Oral, Q15 Min PRN **OR** Glucose-Vitamin C (DEX-4) chewable tab 4 tablet, 4 tablet, Oral, Q15 Min PRN **OR** dextrose 50 % injec mg, Intravenous, Q6H PRN  •  zolpidem (AMBIEN) tab 5 mg, 5 mg, Oral, Nightly PRN  •  HYDROcodone-acetaminophen (NORCO) 5-325 MG per tab 1 tablet, 1 tablet, Oral, Q6H PRN  •  calcium acetate (PHOSLO) cap 667 mg, 1 capsule, Oral, TID CC  •  Labetalol HCl (TR Location: Left arm)   Pulse 58   Temp 97.6 °F (36.4 °C) (Temporal)   Resp 16   Ht 5' 9\" (1.753 m)   Wt 177 lb 14.6 oz (80.7 kg)   SpO2 95%   BMI 26.27 kg/m²   GENERAL: In no apparent distress  HEENT: atraumatic, normocephalic  NECK: supple, no adenopathy,

## 2020-04-04 NOTE — RESPIRATORY THERAPY NOTE
Received pt on vent with settings:20/600/+5/75%, BS diminished bilateral, SX ET small light thick secretions, after done ABG rr changed to 24, put et co2 reading 27-33. Pt has stacking breaths, peak pressure 41-43. Will continue to monitor pr closely.

## 2020-04-04 NOTE — PHYSICAL THERAPY NOTE
Attempted to see patient for PT evaluation. Spoke with RN who states pt remains intubated, not appropriate for PT at this time. Will continue to follow.

## 2020-04-04 NOTE — PLAN OF CARE
Patient sedated on Propofol, occasionally opens eyes with stimulation, Fentanyl gtt for comfort. Does not follows commands, gets anxious with too much continuous stimulation, patient must be given short breaks between nursing care.  RT titrating oxygen on v

## 2020-04-04 NOTE — PROGRESS NOTES
[facility] Progress Note    I was called to bedside, pt with increasing vent dyssynchrony and desaturation requiring FiO2 to be increased to 100%, still only saturating 89%  Vent alarming with high peak pressures.   Plateau pressure is 34 on current sett MD

## 2020-04-04 NOTE — PROGRESS NOTES
[facility]  Progress Note     Yocasta Coley Patient Status:  Inpatient    9/15/1955 MRN [id number]   [facility] 6NE-A Attending Mike Jordan MD   Hosp Day # 12 PCP None Pcp     Chief Complaint: SOB  S:  Intubated, sedate --   --  145*  --   --   --   --    AST  --   --  113*  --   --   --   --    ALT  --   --  31  --   --   --   --    BILT  --   --  0.7  --   --   --   --    TP  --   --  7.5  --   --   --   --     < > = values in this interval not displayed.      Estimated Meropenem  3. CC & ID on consult  5. C.diff- cont PO vancomycin and isolation   6. Anemia, chronic disease- +fecal occult/ GI bleed  1. GI consulted   2. S/p transfusion 4/2   7.  UC   8.  CAD sp CABG- stop  ASA, plavix   Cont , BB  9. P Afib RVR- Cardizem

## 2020-04-04 NOTE — PROGRESS NOTES
[facility]  Nephrology Progress Note    Reta Mckeon Patient Status:  Inpatient    9/15/1955 MRN [id number]   [facility] 4SW-A Attending Marlen Pedraza MD   Hosp Day # 12 PCP None Pcp       SUBJECTIVE:    Intubated/sedated  He ARREST), , , PRN  glucose (DEX4) oral liquid 15 g, 15 g, Oral, Q15 Min PRN    Or  Glucose-Vitamin C (DEX-4) chewable tab 4 tablet, 4 tablet, Oral, Q15 Min PRN    Or  dextrose 50 % injection 50 mL, 50 mL, Intravenous, Q15 Min PRN    Or  glucose (DEX4) oral tab 1 tablet, 1 tablet, Oral, Q6H PRN  calcium acetate (PHOSLO) cap 667 mg, 1 capsule, Oral, TID CC  Labetalol HCl (TRANDATE) injection 20 mg, 20 mg, Intravenous, Q4H PRN            Physical Exam:   /70 (BP Location: Left arm)   Pulse 59   Temp 97.1 vasculitis (hx GPA) but noted to have NH-3 ab level at 77 and CT does demonstrate diffuse ground glass opacities as well as focal pneumonia RML.   COVD-19 neg  S/p bronch 4/1; no sig bleeding noted  - remains on IV abx  - IV steroids  +  S/p rituximab + MYRNA

## 2020-04-04 NOTE — PROGRESS NOTES
Reviewed course with Dr. Lea Adhikari and Dr. Maryjo Cesar. Intubated and sedated.     Maintaining NSR    Afebrile  113/57  64 regular    HEENT: ETT  Lungs no wheezing  Ht RRR  abd soft - no apparent tenderness  Ext SCD's and protective boots on legs/feet - arms s

## 2020-04-04 NOTE — PROGRESS NOTES
[facility] PULMONARY/[facility]ITICAL CARE    S: No new events overnight, does not tolerate rotation to R. Stacking breaths and triggering alarm.     Meds:  • epoetin donita-epbx  10,000 Units Intravenous Once in dialysis   • vancomycin HCl  125 mg Per NG Tube 4 times per Rate (L/min): 7 L/min  Pulse Oximetry Type: Continuous  Oximetry Probe Site Changed: No  Pulse Ox Probe Location: Right hand  Ventilator Settings: AC 16// FIO2 75/PEEP 5  FiO2 (%): 75 %      I/O last 3 completed shifts: In: 4490.9 [I.V.:3904.9;  Bloo in the last 168 hours. No results for input(s): CRP, DDIMER, LDH, SOURAV, CK in the last 72 hours.   Lab Results   Component Value Date    COVID19 Not Detected 03/25/2020     Recent Labs     04/03/20  0945   ABGPHT 7.36   OKZMXN2F 39   OFEOT9P 71*   ABGHCO3 2 Care Time greater than: 45 minutes    Bradford Bazzi M.D.   Pulmonary/Critical Care and Sleep Medicine

## 2020-04-05 NOTE — PROGRESS NOTES
[facility]                INFECTIOUS DISEASE PROGRESS NOTE    Varghese Myers Patient Status:  Inpatient    9/15/1955 MRN [id number]   [facility] 4SW-A Attending Jose Contreras MD   Hosp Day # 15 PCP None Pcp     Antibiotics CULTURE     Status: None    Collection Time: 03/26/20  6:49 PM   Result Value Ref Range    Blood Culture Result No Growth 5 Days N/A         Radiology  CXR bilateral infilt      Problem list reviewed:  Patient Active Problem List:     ESRD (end stage renal pulmonary  Completed meropenem on 3/30, cefazolin was resumed for previous staph aureus bacteremia and possible endocarditis  -was tested negative for COVIDx2, and picture was not typical for this infection    2. CDIFF on po vanc, no diarrhea    3.  Staph a

## 2020-04-05 NOTE — PLAN OF CARE
Patient is intubated and sedated, on propofol at 100mcg/min,  precedex at 1.0 mcg/kg/hr and fentanyl drip at 100 mcg/hr . Levophed now is down to 4mcg/min to keep systolic BP above 90 mmHg or MAP above 65mmHg.  Patient is currently on 90% FIO2 with O2 satura

## 2020-04-05 NOTE — PLAN OF CARE
Received patient at 0700. Remains committed to mechanical vent via ETT. Precedex, propofol and fentanyl infusing for sedation. Titrated to keep patient from stacking ventilator breaths. No secretions through inline suctioning.  TF through [address] tolerated well

## 2020-04-05 NOTE — PROGRESS NOTES
[facility]  Progress Note    Winsome Escobar Patient Status:  Inpatient    9/15/1955 MRN [id number]   [facility] 6NE-A Attending Anderson Rodarte MD   Hosp Day # 13 PCP None Pcp     Subjective:  Sedated on the ventilator.   No gi bl docusate sodium (COLACE) liquid 100 mg, 100 mg, Oral, BID  •  PEG 3350 (MIRALAX) powder packet 17 g, 17 g, Oral, Daily PRN  •  bisacodyl (DULCOLAX) rectal suppository 10 mg, 10 mg, Rectal, Daily PRN  •  Chlorhexidine Gluconate (PERIDEX) 0.12 % solution 15 HYDROmorphone HCl (DILAUDID) 1 MG/ML injection 0.5 mg, 0.5 mg, Intravenous, Q4H PRN **OR** HYDROmorphone HCl (DILAUDID) 1 MG/ML injection 1 mg, 1 mg, Intravenous, Q4H PRN  •  acetaminophen (TYLENOL EXTRA STRENGTH) tab 1,000 mg, 1,000 mg, Oral, Q6H PRN  • [facility] CVOR         Objective:  Blood pressure 101/49, pulse 60, temperature 97.1 °F (36.2 °C), temperature source Temporal, resp. rate 20, height 5' 9\" (1.753 m), weight 183 lb 3.2 oz (83.1 kg), SpO2 94 %. EXAM:  /49   Pulse 60   Temp 97.1 °F (36.

## 2020-04-05 NOTE — PROGRESS NOTES
Received pt on PRVC 28/500/100%/+5. FiO2 weaned to 90%. Suctioning very little from ET tube. ETCO2 reading 32. Will continue to monitor closely.       04/05/20 0317   Vent Information   $ RT Standby Charge (per 15 min) 1  (vent check)   Ventilator Initiatio

## 2020-04-05 NOTE — PROGRESS NOTES
[address]  Patient Status:  Inpatient    9/15/1955 MRN [id number]   [facility] 6NE-A Attending Geoff Laws MD   [facility] Day # 15 PCP None Pcp     Critical Care Progress Note     Date of Admission: 3/23/2020  8: Insulin Aspart Pen (NOVOLOG) 100 UNIT/ML flexpen 1-10 Units, 1-10 Units, Subcutaneous, 4 times per day  •  insulin detemir (LEVEMIR) 100 UNIT/ML flextouch 12 Units, 12 Units, Subcutaneous, Nightly  •  fentaNYL citrate (SUBLIMAZE) 0.05 MG/ML injection 25 mc methylPREDNISolone Sodium Succ (Solu-MEDROL) injection 125 mg, 125 mg, Intravenous, Q8H HERNANDO  •  Dexmedetomidine HCl in NaCl (PRECEDEX) 400 MCG/100ML premix infusion, 0.2-1.5 mcg/kg/hr (Dosing Weight), Intravenous, Continuous  •  Normal Saline Flush 0.9 % i or deformity.                         HWZAZ: BHOQQGZ rate and rhythm, normal S1S2                          Abdomen: soft, non-tender, non-distended, positive BS.                         Extremity: No clubbing or cyanosis.                            UXBV:

## 2020-04-05 NOTE — PROGRESS NOTES
[facility]    S: Pt remains intubated, sedated.       Meds:  • pantoprazole (PROTONIX) IV push  40 mg Intravenous Q12H   • fentaNYL PCA bolus from pump  50 mcg Intravenous Once   • cisatracurium (NIMBEX) bolus from bag  0.2 mg/kg (Dosing We TempSrc:       SpO2: 94% 94% 93% 92%   Weight:       Height:         Vent Mode: PRVC/AC  FiO2 (%):  [75 %-100 %] 90 %  S RR:  [20-28] 28  S VT:  [500 mL-600 mL] 500 mL  PEEP/CPAP (cm H2O):  [5 cm H20] 5 cm H20  MAP (cm H2O):  [17-18] 17       Gen - intub ancef  -vanco po for c diff  4. CV - Systolic HF (EF: 55%), R-VTH w/ RVR  -per cardiology  5. Renal - ESRD on HD, hyponatremia  -per renal  6. Anemia - acute blood loss from GIB  -transfuse PRBC to keep > 7  7. GI - melena/CGE - hx of PUD.  EGD 4/1 -- NG tr

## 2020-04-05 NOTE — PROGRESS NOTES
[facility]  Progress Note     Randeepeter Hendrix Patient Status:  Inpatient    9/15/1955 MRN [id number]   [facility] 6NE-A Attending Venancio Arshad MD   Hosp Day # 15 PCP None Pcp     Chief Complaint: SOB  S:  Intubated, sedate = values in this interval not displayed. Estimated Creatinine Clearance: 16.8 mL/min (A) (based on SCr of 4.32 mg/dL (H)). No results for input(s): PTP, INR in the last 168 hours. No results for input(s): TROP, CK in the last 168 hours.      Imaging: chronic disease- +fecal occult/ GI bleed  1. GI consulted   2. S/p transfusion 4/2   7.  UC   8. CAD sp CABG- stop  ASA, plavix   Cont , BB  9. P Afib RVR- Cardizem and cardio following   10.  Chronic systolic heart failure with ischemic cardiomyopathy, EF

## 2020-04-05 NOTE — RESPIRATORY THERAPY NOTE
CALLED TO ROOM FOR DESATURATION EPISODE. LAVAGE AND SUCTION DOWN ETT WITHOUT ANY RESISTANCE OR SPUTUM. INCREASED , ABG DRAWN OFF ART LINE, INCREASED PEEP +10. CALLED DR. Stas Lee.  WANTS PATIENT PARALYZED.   ABG WHEN COMPLETELY OUT  CALL WITH RESULTS

## 2020-04-05 NOTE — PROGRESS NOTES
Seen and examined. Reviewed overnight course with nursing staff. Intubated.  Requiring high doses of sedation and subsequently vasopressor support with levophed    Afebrile  HR's slower with precedex -- sinus   systolic    HEENT: ETT  Lungs no whee

## 2020-04-06 NOTE — PROGRESS NOTES
04/06/20 0433   Clinical Encounter Type   Visited With Family  ( visited with and provided pastoral ministry to the patient's wife and two sons.)   Routine Visit Follow-up   Continue Visiting No   Crisis Visit Critical care  (Patient is imminent

## 2020-04-06 NOTE — PROGRESS NOTES
[facility]    S: despite paralyzation the patient continues to be difficult to oxygenate and worsening resp acidosis. Resp it having to bag patient to bring up O2 sats.     Meds:  • phenylephrine in NaCl       • sodium bicarbonate  50 mEq 101/55 93/61   Pulse: 60 59 63 60   Resp: 20 15 23 25   Temp:    97.2 °F (36.2 °C)   TempSrc:    Temporal   SpO2: 94% 93% 93% 92%   Weight:       Height:         Oxygen Therapy  SpO2: 92 %  O2 Device: Ventilator  FiO2 (%): 100 %  O2 Flow Rate (L/min): 32 L 7.22* 7.14*   [id number] 58* 57* 52* 58* 80*   REGRC8O 73* 105 75* 68* 86   ABGHCO3 25.9 25.5 22.4 23.2 26.5*   FIO2 100 100 90 100 100     Recent Labs     04/04/20  1526 04/05/20  1748 04/05/20  1904   LACTIART <1.6 <1.6 <1.6       Lab Results   Component V

## 2020-04-06 NOTE — PROGRESS NOTES
04/06/20 0514   Clinical Encounter Type   Visited With Patient and family together   Routine Visit Follow-up   Crisis Visit Death  ( provided pastoral ministry and comfort to the family,)   Patient's Supportive Strategies/Resources  prov

## 2020-04-06 NOTE — PLAN OF CARE
Patient continued to decline after decreasing FIO2 to 21%. Monitor showed asystole at 0503H. Upon assessing patient, patient has no pulse, no respiration, no gag reflex, no cough, pupils fixed and dilated. Time of death is 0503H.  Dr. Rosemary Nuñez notifie

## 2020-04-06 NOTE — PLAN OF CARE
Patient's wife and two sons Iza Mcneil and My Fairly decided that they want to just make the patient comfortable. They all verbalized their understanding of the severity of patient's condition and his poor prognosis.  Patient's wife Moncho Jack along with her two sons wants

## 2020-04-06 NOTE — PROGRESS NOTES
04/05/20 8151   Clinical Encounter Type   Visited With Family  (Patient is intubated and per the nurse, patient's prognosis is very poor.   Pateint is imminent.)   Routine Visit Introduction   Continue Visiting No   Patient's Supportive Strategies/Resour

## 2020-04-06 NOTE — PROGRESS NOTES
Despite change to PC ventilation and increase in PEEP the patient's oxygenation has not improved. O2 sats remain at 89-90. The patient ETCO2 has continued to climb from 65 earlier today to 88 now.   Started on bicarb gtt per renal due to worsening acidosis

## 2020-04-06 NOTE — PLAN OF CARE
Patient's wife and sister at the bedside. Dr. Murtaza Mccann was here at around 10:30PM and had spoken with [facility], patient's wife and Eber Jack ( patient's sister in law). Dr. Murtaza Mccann explained to both of them patient's poor prognosis. Patient is now a DNR.

## 2020-04-06 NOTE — PROGRESS NOTES
Received pt on PRVC 24/450/100%/+10. At 1900 pt desaturating to 87%. Peak pressures 42, plateau 32. Began bagging pt with a PEEP of 10 to maintain saturations in the 90's. ETCO2 reading in the 80's. ABG done and critical results reported to Dr. Sukhjinder Chand.  Coy Kate

## 2020-04-07 NOTE — DISCHARGE SUMMARY
[facility] [place] [facility]  DISCHARGE SUMMARY     Gadiel Benavides Patient Status:  Inpatient    9/15/1955 MRN [id number]   [facility] 6NE-A Attending No att. providers found   [facility] Day # 14 PCP None Pcp     Date of Admission: 3/23/2020  Date o approximately 2 weeks ago with his sons. He is unaware of any exposure to Covid. Sons feel fine.     Brief Synopsis:   12-year-old male presented with shortness of breath had recently had a bacteremia and pneumonia had been taking antibiotics with dialysi and possible endocarditis  • Tested negative for cold for x2  • Omn  p.o. vancomycin for C.  Difficile  • Was on PPI twice daily and Carafate 4 times daily  • On HD per nephrology  • Family requesting autopsy  • [facility],  notified patient is a high donat

## 2021-09-08 ENCOUNTER — TELEPHONE (OUTPATIENT)
Dept: RHEUMATOLOGY | Facility: CLINIC | Age: 66
End: 2021-09-08

## 2021-09-08 NOTE — TELEPHONE ENCOUNTER
Had to access chart, due to a deposition for Dr Gianni Kirk, had to check and see, it Dr Gianni Kirk actually treated this gentelman

## 2022-07-29 NOTE — PROGRESS NOTES
Outpatient Physical Therapy Ortho Treatment Note   Kashif     Patient Name: Antonio Toth IV  : 1978  MRN: [id number]  Oscar Noble; Mercy Hospital Logan County – Guthrie [facility]  Nephrology Progress Note    Yennifer Patton Attending:  Braxton Renteria MD       Assessment and Plan:    1) ESRD- due to recurrent Wegener's granulomatosis; unlikely to recover despite steroids / pheresis / rituxan.  PLAN- to complete c [id number] - Olman Sanchez, PT GP 2    [phone]   studies reviewed.     Meds:     Current Facility-Administered Medications:  sulfamethoxazole-trimethoprim (BACTRIM,SEPTRA) 400-80 MG per tab 1 tablet 1 tablet Oral Once per day on Mon Wed Fri   nystatin (MYCOSTATIN) suspension 500,000 Units 5 mL Oral QID PT MANUAL THERAPY EA 15 MIN 7/29/2022 Olman Sanchez, PT GP 1                    Olman Sanchez, PT  7/29/2022

## (undated) DEVICE — SUTURE PROLENE 6-0 BV-1

## (undated) DEVICE — FORCEP RADIAL JAW 4

## (undated) DEVICE — SYRINGE 10ML SLIP TIP

## (undated) DEVICE — CATH GOLD PROBE HEMOGLIDE 7FR

## (undated) DEVICE — DERMABOND LIQUID ADHESIVE

## (undated) DEVICE — 1200CC GUARDIAN II: Brand: GUARDIAN

## (undated) DEVICE — REM POLYHESIVE ADULT PATIENT RETURN ELECTRODE: Brand: VALLEYLAB

## (undated) DEVICE — MEDI-VAC SUCTION HANDLE REGULAR CAPACITY: Brand: CARDINAL HEALTH

## (undated) DEVICE — INDICATED FOR SURGICAL CLAMPING DURING CARDIOVASCULAR PERIPHERAL VASCULAR, AND GENERAL SURGERY.: Brand: SOFT/FIBRA® SPRING CLIP

## (undated) DEVICE — DISPOSABLE DISTAL ATTACHMENT: Brand: DISPOSABLE DISTAL ATTACHMENT

## (undated) DEVICE — KENDALL SCD EXPRESS SLEEVES, KNEE LENGTH, MEDIUM: Brand: KENDALL SCD

## (undated) DEVICE — 3M™ RED DOT™ MONITORING ELECTRODE WITH FOAM TAPE AND STICKY GEL, 50/BAG, 20/CASE, 72/PLT 2570: Brand: RED DOT™

## (undated) DEVICE — NEEDLE CONTRAST INTERJECT 25G

## (undated) DEVICE — Device: Brand: DEFENDO AIR/WATER/SUCTION AND BIOPSY VALVE

## (undated) DEVICE — MEDI-VAC NON-CONDUCTIVE SUCTION TUBING: Brand: CARDINAL HEALTH

## (undated) DEVICE — SNARE CAPTIFLEX MICRO-OVL OLY

## (undated) DEVICE — BOWLS UTILITY 16OZ

## (undated) DEVICE — FORCEP BIOPSY RJ4 LG CAP W/ND

## (undated) DEVICE — ENDOSCOPY PACK UPPER: Brand: MEDLINE INDUSTRIES, INC.

## (undated) DEVICE — 60 ML SYRINGE REGULAR TIP: Brand: MONOJECT

## (undated) DEVICE — CHLORAPREP 26ML APPLICATOR

## (undated) DEVICE — CLIP RESOLUTION 235CM

## (undated) DEVICE — SOL  .9 500ML

## (undated) DEVICE — INTENDED TO BE USED TO OCCLUDE, RETRACT AND IDENTIFY ARTERIES, VEINS, TENDONS AND NERVES IN SURGICAL PROCEDURES: Brand: STERION®  VESSEL LOOP

## (undated) DEVICE — MASK ISOLATION

## (undated) DEVICE — TRANSPOSAL ULTRAFLEX DUO/QUAD ULTRA CART MANIFOLD

## (undated) DEVICE — FILTERLINE NASAL ADULT O2/CO2

## (undated) DEVICE — HEMOCLIP HORIZON SM MULTI

## (undated) DEVICE — SUTURE VICRYL 3-0 SH

## (undated) DEVICE — GLOVE BIOGEL M SURG SZ 71/2

## (undated) DEVICE — SINGLE USE SUCTION VALVE MAJ-209: Brand: SINGLE USE SUCTION VALVE (STERILE)

## (undated) DEVICE — TRAP 4 CPTR CHMBR N EZ INLN

## (undated) DEVICE — SUTURE SILK 3-0

## (undated) DEVICE — SYRINGE 20CC LL TIP

## (undated) DEVICE — GOWN SURG AERO CHROME XXL

## (undated) DEVICE — CV PACK-LF: Brand: MEDLINE INDUSTRIES, INC.

## (undated) DEVICE — BASIC DOUBLE BASIN 1-LF: Brand: MEDLINE INDUSTRIES, INC.

## (undated) DEVICE — SINGLE USE BIOPSY VALVE MAJ-210: Brand: SINGLE USE BIOPSY VALVE (STERILE)

## (undated) DEVICE — AIRLIFE&#8482 MISTY MAX 10 NEBULIZER W 7 (2.1 M) CRUSH RESISTANT OXYGEN TUBING BAFFLED TEE ADAPTER, MOUTHPIECE: Brand: AIRLIFE

## (undated) DEVICE — ABSORBABLE HEMOSTAT (OXIDIZED REGENERATED CELLULOSE, U.S.P.): Brand: SURGICEL

## (undated) DEVICE — DRAPE,EXTREMITY,89X128,STERILE: Brand: MEDLINE

## (undated) DEVICE — GEL AQUASONIC 100 20GR

## (undated) DEVICE — SUTURE MONOCRYL 4-0 PS-2

## (undated) DEVICE — ENDOSCOPY PACK - LOWER: Brand: MEDLINE INDUSTRIES, INC.

## (undated) DEVICE — VIOLET BRAIDED (POLYGLACTIN 910), SYNTHETIC ABSORBABLE SUTURE: Brand: COATED VICRYL

## (undated) DEVICE — CLIP LGT 11MM OPEN 2.8MM 235CM

## (undated) DEVICE — SOL  .9 1000ML BTL

## (undated) DEVICE — DECANTER BAG 9": Brand: MEDLINE INDUSTRIES, INC.

## (undated) DEVICE — SPECIMEN TRAP LUKI

## (undated) DEVICE — STERILE POLYISOPRENE POWDER-FREE SURGICAL GLOVES: Brand: PROTEXIS

## (undated) NOTE — LETTER
[facility]  Jhonny Maddymichi [address] [facility], [address] [place] [address]    Consent for Operation    Date: __________________    Time: _______________    1.  I authorize the performance upon Triston Lcoke the following operation:      right radiocephalic arteriov procedure has been videotaped, the surgeon will obtain the original videotape. The hospital will not be responsible for storage or maintenance of this tape.     6. For the purpose of advancing medical education, I consent to the admittance of observers to t STATEMENTS REQUIRING INSERTION OR COMPLETION WERE FILLED IN.     Signature of Patient:   ___________________________    When the patient is a minor or mentally incompetent to give consent:  Signature of person authorized to consent for patient: ____________ supplements, and pills I can buy without a prescription (including street drugs/illegal medications). Failure to inform my anesthesiologist about these medicines may increase my risk of anesthetic complications.   · If I am allergic to anything or have had Anesthesiologist Signature     Date   Time  I have discussed the procedure and information above with the patient (or patient’s representative) and answered their questions. The patient or their representative has agreed to have anesthesia services.     ___

## (undated) NOTE — LETTER
Kacie Barron 182 [address] [address]  [place], [address] [facility]    Consent for Operation  Date: __________________                                Time: _______________    1.  I authorize the performance upon Triston Locke the following operation:  Proc procedure has been videotaped, the surgeon will obtain the original videotape. The hospital will not be responsible for storage or maintenance of this tape.     6. For the purpose of advancing medical education, I consent to the admittance of observers to t STATEMENTS REQUIRING INSERTION OR COMPLETION WERE FILLED IN.     Signature of Patient:   ___________________________    When the patient is a minor or mentally incompetent to give consent:  Signature of person authorized to consent for patient: ____________

## (undated) NOTE — LETTER
Consent to Procedure/Sedation    Date: __________________    Time: _______________    1. I authorize the performance upon [place] the following:  PLASMAPHERESIS     2.  I authorize Dr. _________________________ (and whomever is designated as th ___________________________    ___________________    Witness: ____________________     Date: ______________    Printed: 2017   9:15 AM    Patient Name: Mara Hernandez        : 9/15/1955       Medical Record #: [id number]

## (undated) NOTE — LETTER
[address] [facility]     I agree to have a Peripherally Inserted Central Catheter (PICC) placed in my arm.    1. The PICC insertion procedure, care, maintenance, risks, benefits, and complications have been explained to me by my physic benefits, and side effects related to the alternatives and risks related to not receiving this procedure. 8.  I have expressed any questions about this procedure to my physician or the PICC Proceduralist and he/she has answered them.   I certify that I h

## (undated) NOTE — MR AVS SNAPSHOT
[facility], [place] [address] [facility], [address] [address] [phone]               Thank you for choosing us for your health care visit with Aram Slade MD.  We are glad to serve you and happy to provide you with this Penicillin [Penicillin G Potassium] Unknown                Today's Vital Signs     BP Weight                118/74 mmHg 199 lb             Current Medications          This list is accurate as of: 4/20/17  5:15 PM.  Always use your most recent med list. You can access your MyChart to more actively manage your health care and view more details from this visit by going to [email or web address].   If you've recently had a stay at the Hospital you can access your discharge instructions in [address] by filemon

## (undated) NOTE — IP AVS SNAPSHOT
[facility] [place] [address] [facility], [address] ~ [phone]                Discharge Summary   2017    [facility]           Admission Information        Provider Department    2017 Yusra Cao MD  3ne Inhale 2 puffs into the lungs every 4 to 6 hours as needed for Wheezing.    Stop taking on:  5/22/2017    Sisi Mcgrath                           albuterol sulfate (2.5 MG/3ML) 0.083% Nebu   Commonly known as:  VENTOLIN        Take 3 mL (2.5 mg total Follow up with Umesh Martínez MD. Schedule an appointment as soon as possible for a visit in 1 month.     Specialty:  NEPHROLOGY    Contact information:    Lucy Starkey [address] [facility]  [phone]          Follow up with Ephraim Cordova 0.0  (04/29/17)  6.84 (H) (04/29/17)  1.14 (04/29/17)  0.56 (04/29/17)  0.08 (04/29/17)  0.00    (04/12/17)  94.0 (04/12/17)  1.0 (04/12/17)  2.9 (04/12/17)  0.0 (04/12/17)  0.2  (04/12/17)  17.58 (H) (04/12/17)  0.19 (L) (04/12/17)  0.54 (04/12/17)  0.00 Megan 112. MyChart     Visit Anatole  You can access your MyChart to more actively manage your health care and view more details from this visit by going to [email or web address].   If you've recently had a stay at the [facility] What to report to your healthcare team:  Dizziness, nausea, chest pain, weakness, numbness           Water Pills     bumetanide 2 MG Oral Tab       Use: Treat high blood pressure, swelling in legs, too much fluid    Most common side effects: Dizziness, los Most common side effects:  Increased blood sugar, high blood pressure, fluid retention, mood changes, stomach upset, headache, dizziness   What to report to your healthcare provider: Increase in blood sugar, high blood pressure, fluid retention, mood change

## (undated) NOTE — LETTER
Consent to Procedure/Sedation    Date:    Time: _______________    1. I authorize the performance upon Perico Lay the followin.  I authorize Dr. Jodee Carter (and whomever is designated as the doctor’s assistant), to per ___________________________    ___________________    Witness: ____________________     Date: ______________    Printed: 2020   10:56 AM    Patient Name: John Mo YOB: 1955       Medical Record #: [id number]

## (undated) NOTE — MR AVS SNAPSHOT
[facility], [place] [address] [facility], [address] [address] [phone]               Thank you for choosing us for your health care visit with Sophie Mujica MD.  We are glad to serve you and happy to provide you with this Inject 1-5 Units into the skin TID CC and HS. Commonly known as:  NOVOLOG           Pantoprazole Sodium 20 MG Tbec   Take 1 tablet (20 mg total) by mouth daily.  Before meal   Commonly known as:  PROTONIX           predniSONE 20 MG Tabs   Take 1 tablet (2

## (undated) NOTE — LETTER
Kacie Barron 182 6 [address] [address]  [place], [address] [facility]    Consent for Operation  Date: __________________                                Time: _______________    1.  I authorize the performance upon Anabelle Mulligan the following operation:  Proc procedure has been videotaped, the surgeon will obtain the original videotape. The hospital will not be responsible for storage or maintenance of this tape.     6. For the purpose of advancing medical education, I consent to the admittance of observers to t STATEMENTS REQUIRING INSERTION OR COMPLETION WERE FILLED IN.     Signature of Patient:   ___________________________    When the patient is a minor or mentally incompetent to give consent:  Signature of person authorized to consent for patient: ____________

## (undated) NOTE — LETTER
Kacie Barron 182 6 [address] [address]  [place], [address] [facility]    Consent for Operation  Date: __________________                                Time: _______________    1.  I authorize the performance upon Danielle Gary the following operation:  Proc procedure has been videotaped, the surgeon will obtain the original videotape. The hospital will not be responsible for storage or maintenance of this tape.   7. For the purpose of advancing medical education, I consent to the admittance of observers to the STATEMENTS REQUIRING INSERTION OR COMPLETION WERE FILLED IN.     Signature of Patient:   ___________________________    When the patient is a minor or mentally incompetent to give consent:  Signature of person authorized to consent for patient: ____________ supplements, and pills I can buy without a prescription (including street drugs/illegal medications). Failure to inform my anesthesiologist about these medicines may increase my risk of anesthetic complications. iv.  If I am allergic to anything or have ha Anesthesiologist Signature     Date   Time  I have discussed the procedure and information above with the patient (or patient’s representative) and answered their questions. The patient or their representative has agreed to have anesthesia services.     ___

## (undated) NOTE — IP AVS SNAPSHOT
[facility] [place] [address] [place], [address] ~ [phone]                Discharge Summary   4/9/2017    [facility]           Admission Information        Provider Department    4/9/2017 Joann Mariano MD  2ne-A Take 1 tablet (200 mg total) by mouth daily. Stop taking on:  4/29/2017    Anya Cash                           hydrALAzine HCl 50 MG Tabs   Last time this was given:  50 mg on 4/15/2017  5:04 AM   Commonly known as:  APRESOLINE   Next dose due:   Neyda Garcia Take 3 mL (2.5 mg total) by nebulization every 6 (six) hours as needed for Wheezing. Igor Vasques                           aspirin 81 MG Tbec   Next dose due: Take tomorrow        Take 1 tablet (81 mg total) by mouth daily.     Jorge Luis Manner For blood sugar > 400 please notify your primary care provider for further adjustment of your insulin dosing. Monitor blood pressure daily and report to cardiologist if systolic (top number) below 90.     Follow-up Information     Follow up with [facility] 91.0 -- -- -- (04/15/17)  107.0 (L) --    (04/14/17)  20.6 (H) (04/14/17)  2.78 (L) (04/14/17)  8.5 (L) (04/14/17)  25.5 (L) (04/14/17)  91.7    (04/14/17)  154.0     (04/12/17)  18.7 (H) (04/12/17)  2.99 (L) (04/12/17)  9.0 (L) (04/12/17)  27.2 (L) (04/12 We are concerned for your overall well being:    - If you are a smoker or have smoked in the last 12 months, we encourage you to explore options for quitting.     - If you have concerns related to behavioral health issues or thoughts of harming yourself, call your provider or healthcare team if you have any questions regarding your medications while at home.          Ant-Infective Medications     acyclovir 400 MG Oral Tab    fluconazole 200 MG Oral Tab    sulfamethoxazole-trimethoprim 400-80 MG Oral Tab twice a week or high blood sugar (greater than 200) for more than 2-3 days           Non-Narcotic Pain Medications     aspirin 81 MG Oral Tab EC       Use: Treat pain, fever, inflammation   Most common side effects: Stomach upset   What to report to your h

## (undated) NOTE — MR AVS SNAPSHOT
After Visit Summary   6/20/2017    Denton Hdez    MRN: [id number]           Diagnoses this Visit     Wegener's granulomatosis with renal involvement ([facility].)    -  Primary       Allergies     Penicillin [Penicillin G Potassium] Unknown      Jose F Bonilla · mouth sores  · redness, blistering, peeling or loosening of the skin, including inside the mouth  · stomach pain  · swelling of the ankles, feet, or hands  · trouble passing urine or change in the amount of urine  Side effects that usually do not require or health care professional if you notice any unusual bleeding. Be careful brushing and flossing your teeth or using a toothpick because you may get an infection or bleed more easily.  If you have any dental work done, tell your dentist you are receiving t Call your healthcare provider if you have any of the following:  · Fever of 100.4 ºF (38 ºC) or higher, or as directed by your healthcare provider  · Chest pain  · Trouble breathing, wheezing  · Swelling of the lips, tongue, or throat  · Rash or hives  · O Result Summary for CBC WITH DIFFERENTIAL WITH PLATELET      Narrative     The following orders were created for panel order CBC WITH DIFFERENTIAL WITH PLATELET.   Procedure                               Abnormality         Status                     ------- Summaries. If you've been to the Emergency Department or your doctor's office, you can view your past visit information in cartmi by going to Visits < Visit Summaries. cartmi questions? Call [phone] for help.   cartmi is NOT to be used for urge

## (undated) NOTE — ED AVS SNAPSHOT
Anh Donnelly   MRN: [id number]    Department:  [facility] in [place]   Date of Visit:  8/29/2019           Disclosure     Insurance plans vary and the physician(s) referred by the ER may not be covered by your plan.  Please c tell this physician (or your personal doctor if your instructions are to return to your personal doctor) about any new or lasting problems. The primary care or specialist physician will see patients referred from the [facility] Emergency Department.  Nayeli Last

## (undated) NOTE — LETTER
11/28/2017          [address] [id number]    Dear Canelo Stovall,     Here are the  biopsy/pathology findings from your recent Colonoscopy :      1) diverticulosis was noted (small pouches in the lining of the colon

## (undated) NOTE — LETTER
[facility]  Jhonny Fowler [address] [facility], [address] [place] [address]    Consent for Operation    Date: __________________    Time: _______________    1.  I authorize the performance upon Danyel Blevins the following operation:    Procedure(s):  dulcehrt radiocep procedure has been videotaped, the surgeon will obtain the original videotape. The hospital will not be responsible for storage or maintenance of this tape.     6. For the purpose of advancing medical education, I consent to the admittance of observers to t STATEMENTS REQUIRING INSERTION OR COMPLETION WERE FILLED IN.     Signature of Patient:   ___________________________    When the patient is a minor or mentally incompetent to give consent:  Signature of person authorized to consent for patient: ____________ supplements, and pills I can buy without a prescription (including street drugs/illegal medications). Failure to inform my anesthesiologist about these medicines may increase my risk of anesthetic complications.   · If I am allergic to anything or have had Anesthesiologist Signature     Date   Time  I have discussed the procedure and information above with the patient (or patient’s representative) and answered their questions. The patient or their representative has agreed to have anesthesia services.     ___

## (undated) NOTE — LETTER
5/4/2017          [phone]    Dear Amrita Velázquez,       Here are the biopsy/pathology findings from your recent EGD (uppper endoscopy):     The biopsy/pathology findings from your upper endoscopy showed